# Patient Record
Sex: FEMALE | Race: WHITE | NOT HISPANIC OR LATINO | Employment: OTHER | URBAN - METROPOLITAN AREA
[De-identification: names, ages, dates, MRNs, and addresses within clinical notes are randomized per-mention and may not be internally consistent; named-entity substitution may affect disease eponyms.]

---

## 2023-04-10 DIAGNOSIS — Z86.69 HISTORY OF MIGRAINE HEADACHES: Primary | ICD-10-CM

## 2023-04-10 RX ORDER — BUTALBITAL, ASPIRIN, CAFFEINE AND CODEINE PHOSPHATE 50; 325; 40; 30 MG/1; MG/1; MG/1; MG/1
1 CAPSULE ORAL EVERY 6 HOURS PRN
Qty: 120 CAPSULE | Refills: 1 | Status: SHIPPED | OUTPATIENT
Start: 2023-04-10 | End: 2023-05-10

## 2023-05-03 LAB
ALANINE AMINOTRANSFERASE (SGPT) (U/L) IN SER/PLAS: 9 U/L (ref 7–45)
ALBUMIN (G/DL) IN SER/PLAS: 4 G/DL (ref 3.4–5)
ALKALINE PHOSPHATASE (U/L) IN SER/PLAS: 95 U/L (ref 33–136)
ANION GAP IN SER/PLAS: 8 MMOL/L (ref 10–20)
ASPARTATE AMINOTRANSFERASE (SGOT) (U/L) IN SER/PLAS: 12 U/L (ref 9–39)
BILIRUBIN TOTAL (MG/DL) IN SER/PLAS: 0.3 MG/DL (ref 0–1.2)
CALCIUM (MG/DL) IN SER/PLAS: 9.8 MG/DL (ref 8.6–10.3)
CARBON DIOXIDE, TOTAL (MMOL/L) IN SER/PLAS: 33 MMOL/L (ref 21–32)
CHLORIDE (MMOL/L) IN SER/PLAS: 101 MMOL/L (ref 98–107)
CREATININE (MG/DL) IN SER/PLAS: 0.93 MG/DL (ref 0.5–1.05)
ERYTHROCYTE DISTRIBUTION WIDTH (RATIO) BY AUTOMATED COUNT: 12.4 % (ref 11.5–14.5)
ERYTHROCYTE MEAN CORPUSCULAR HEMOGLOBIN CONCENTRATION (G/DL) BY AUTOMATED: 33.7 G/DL (ref 32–36)
ERYTHROCYTE MEAN CORPUSCULAR VOLUME (FL) BY AUTOMATED COUNT: 97 FL (ref 80–100)
ERYTHROCYTES (10*6/UL) IN BLOOD BY AUTOMATED COUNT: 3.75 X10E12/L (ref 4–5.2)
GFR FEMALE: 66 ML/MIN/1.73M2
GLUCOSE (MG/DL) IN SER/PLAS: 111 MG/DL (ref 74–99)
HEMATOCRIT (%) IN BLOOD BY AUTOMATED COUNT: 36.5 % (ref 36–46)
HEMOGLOBIN (G/DL) IN BLOOD: 12.3 G/DL (ref 12–16)
LEUKOCYTES (10*3/UL) IN BLOOD BY AUTOMATED COUNT: 4.7 X10E9/L (ref 4.4–11.3)
LIPASE (U/L) IN SER/PLAS: 7 U/L (ref 9–82)
PLATELETS (10*3/UL) IN BLOOD AUTOMATED COUNT: 109 X10E9/L (ref 150–450)
POTASSIUM (MMOL/L) IN SER/PLAS: 3.2 MMOL/L (ref 3.5–5.3)
PROTEIN TOTAL: 6.4 G/DL (ref 6.4–8.2)
SEDIMENTATION RATE, ERYTHROCYTE: <1 MM/H (ref 0–30)
SODIUM (MMOL/L) IN SER/PLAS: 139 MMOL/L (ref 136–145)
UREA NITROGEN (MG/DL) IN SER/PLAS: 12 MG/DL (ref 6–23)

## 2023-06-13 PROBLEM — R41.3 MEMORY LOSS: Status: ACTIVE | Noted: 2023-06-13

## 2023-06-13 PROBLEM — I10 PRIMARY HYPERTENSION: Status: ACTIVE | Noted: 2023-06-13

## 2023-06-13 PROBLEM — E03.9 ACQUIRED HYPOTHYROIDISM: Status: ACTIVE | Noted: 2023-06-13

## 2023-06-13 PROBLEM — G43.009 MIGRAINE WITHOUT AURA: Status: ACTIVE | Noted: 2023-06-13

## 2023-06-13 PROBLEM — E78.2 MIXED HYPERLIPIDEMIA: Status: ACTIVE | Noted: 2023-06-13

## 2023-06-13 PROBLEM — K21.9 GASTROESOPHAGEAL REFLUX DISEASE WITHOUT ESOPHAGITIS: Status: ACTIVE | Noted: 2023-06-13

## 2023-06-15 ENCOUNTER — OFFICE VISIT (OUTPATIENT)
Dept: PRIMARY CARE | Facility: CLINIC | Age: 73
End: 2023-06-15
Payer: MEDICARE

## 2023-06-15 VITALS
HEIGHT: 66 IN | DIASTOLIC BLOOD PRESSURE: 82 MMHG | OXYGEN SATURATION: 99 % | SYSTOLIC BLOOD PRESSURE: 144 MMHG | BODY MASS INDEX: 17.84 KG/M2 | TEMPERATURE: 97.3 F | WEIGHT: 111 LBS | HEART RATE: 64 BPM

## 2023-06-15 DIAGNOSIS — R41.3 MEMORY LOSS: Primary | ICD-10-CM

## 2023-06-15 DIAGNOSIS — G43.009 MIGRAINE WITHOUT AURA AND WITHOUT STATUS MIGRAINOSUS, NOT INTRACTABLE: ICD-10-CM

## 2023-06-15 DIAGNOSIS — M53.3 SACRAL PAIN: ICD-10-CM

## 2023-06-15 DIAGNOSIS — E78.2 MIXED HYPERLIPIDEMIA: ICD-10-CM

## 2023-06-15 DIAGNOSIS — E03.9 ACQUIRED HYPOTHYROIDISM: ICD-10-CM

## 2023-06-15 DIAGNOSIS — I10 PRIMARY HYPERTENSION: ICD-10-CM

## 2023-06-15 DIAGNOSIS — K21.9 GASTROESOPHAGEAL REFLUX DISEASE WITHOUT ESOPHAGITIS: ICD-10-CM

## 2023-06-15 PROCEDURE — 3077F SYST BP >= 140 MM HG: CPT | Performed by: FAMILY MEDICINE

## 2023-06-15 PROCEDURE — 99214 OFFICE O/P EST MOD 30 MIN: CPT | Performed by: FAMILY MEDICINE

## 2023-06-15 PROCEDURE — 3079F DIAST BP 80-89 MM HG: CPT | Performed by: FAMILY MEDICINE

## 2023-06-15 PROCEDURE — 1160F RVW MEDS BY RX/DR IN RCRD: CPT | Performed by: FAMILY MEDICINE

## 2023-06-15 PROCEDURE — 1159F MED LIST DOCD IN RCRD: CPT | Performed by: FAMILY MEDICINE

## 2023-06-15 PROCEDURE — 1036F TOBACCO NON-USER: CPT | Performed by: FAMILY MEDICINE

## 2023-06-15 RX ORDER — LEVOTHYROXINE SODIUM 125 UG/1
125 TABLET ORAL DAILY
COMMUNITY
End: 2023-08-07 | Stop reason: SDUPTHER

## 2023-06-15 RX ORDER — ESTROGENS, CONJUGATED 1.25 MG/1
1.25 TABLET, FILM COATED ORAL DAILY
COMMUNITY
End: 2023-07-20 | Stop reason: SDUPTHER

## 2023-06-15 RX ORDER — BUTALBITAL, ASPIRIN, CAFFEINE AND CODEINE PHOSPHATE 50; 325; 40; 30 MG/1; MG/1; MG/1; MG/1
1 CAPSULE ORAL EVERY 6 HOURS PRN
Qty: 20 CAPSULE | Refills: 3 | Status: SHIPPED | OUTPATIENT
Start: 2023-06-15 | End: 2023-06-16 | Stop reason: SDUPTHER

## 2023-06-15 RX ORDER — DONEPEZIL HYDROCHLORIDE 10 MG/1
10 TABLET, FILM COATED ORAL DAILY
COMMUNITY
Start: 2023-04-24 | End: 2023-09-27 | Stop reason: SDUPTHER

## 2023-06-15 RX ORDER — BUTALBITAL, ASPIRIN, CAFFEINE AND CODEINE PHOSPHATE 50; 325; 40; 30 MG/1; MG/1; MG/1; MG/1
1 CAPSULE ORAL EVERY 6 HOURS PRN
COMMUNITY
End: 2023-06-15 | Stop reason: SDUPTHER

## 2023-06-15 RX ORDER — BUTALBITAL, ASPIRIN, AND CAFFEINE 325; 50; 40 MG/1; MG/1; MG/1
1 CAPSULE ORAL EVERY 4 HOURS PRN
COMMUNITY
Start: 2018-02-21 | End: 2023-09-26 | Stop reason: SDUPTHER

## 2023-06-15 RX ORDER — ERGOCALCIFEROL 1.25 MG/1
CAPSULE ORAL
COMMUNITY
End: 2024-02-23 | Stop reason: SDUPTHER

## 2023-06-15 RX ORDER — LISINOPRIL 20 MG/1
30 TABLET ORAL DAILY
COMMUNITY
End: 2024-02-16 | Stop reason: SDUPTHER

## 2023-06-15 RX ORDER — COLCHICINE 0.6 MG/1
0.6 TABLET ORAL DAILY
COMMUNITY
End: 2023-11-06 | Stop reason: SDUPTHER

## 2023-06-15 RX ORDER — AMLODIPINE BESYLATE 10 MG/1
10 TABLET ORAL DAILY
COMMUNITY
End: 2023-06-26 | Stop reason: SDUPTHER

## 2023-06-15 ASSESSMENT — ENCOUNTER SYMPTOMS
CONFUSION: 1
HEADACHES: 1
BACK PAIN: 1

## 2023-06-15 NOTE — PROGRESS NOTES
Subjective   Patient ID: Aravind Smith is a 73 y.o. female who presents for 4 month follow up.    She is here to follow-up on what appears to be progressive (slowly) dementia.  We discussed getting an update with neurology to see if there is further testing to be done.    She also continues to take butalbital with codeine on a daily basis.  It is my feeling that she gets rebound headaches but when she has been evaluated in the past, she did not seem to get any good advice or outcome.  With the neurology referral, we can get a better idea of how to control her headaches and even prevent them.    She has persistent sacral pain which waxes and wanes and I suggest that we refer her to pain management for an evaluation.  She has had negative imaging studies.     OARRS Report Last Screening Date: Brooke 15, 2023  I have personally reviewed the OARRS report for ARAVIND SMITH. I have considered the risks of abuse, dependence, addiction and diversion. I believe that it is clinically appropriate for this patient to be prescribed this medication based on documented diagnosis.   OARRS report is initialed/dated and scanned into the electronic medical record.   Last urine drug screening date/ordered today: October 31, 2022   Controlled Substance Agreement:   I have printed this form and reviewed each line item with the patient and the patient has verbalized understanding.   Date of the last Controlled Substance Agreement: October 31, 2022   OPIOID   Opioid Risk Screening:   Opioid Risk Tool   Last opioid risk screening date/ordered today: 1/16/19  Patient's total score is 0, within range of Low Risk (0-3).   Pain Scale Screening:   Pain Assessment and Documentation Tool (PADT)   Date of Assessment: February 1, 2023  Analgesia:   Patient reports her pain level on average during the past week is 3 on a 0 - 10 scale.   Patient reports that her pain level at its worst during the past week was 8 on a 0 -10 scale.   60 % of pain has  "been relieved during the past week per patient   Patient states that the amount of pain relief she is now obtaining from her current pain reliever(s) is enough to make a real difference in her life.   Query to clinician: Is the patient's pain relief clinically significant? Yes  Activities of Daily Living:   Physical functioning: Better   Family relationships: Better   Social relationships: Better   Mood: Better   Sleep patterns: Better   Overall functioning: Better   Adverse Events:   No, ARAVIND SMITH is experiencing following side effects from current pain reliever.  Patients overall severity of side effect: None  Is your overall impression that this patient is benefiting (e.g., benefits, such as pain relief, outweigh side effects) from opioid therapy? Yes  Comments: Severe chronic migraines.   Specific Analgesic Plan: Continue present regimen.   Referrals or Alternatives: None.     Review of Systems   Musculoskeletal:  Positive for back pain.   Neurological:  Positive for headaches.   Psychiatric/Behavioral:  Positive for confusion.        Objective   /82 (BP Location: Left arm, Patient Position: Sitting, BP Cuff Size: Adult)   Pulse 64   Temp 36.3 °C (97.3 °F) (Skin)   Ht 1.664 m (5' 5.5\")   Wt 50.3 kg (111 lb)   SpO2 99%   BMI 18.19 kg/m²     Physical Exam  Cardiovascular:      Rate and Rhythm: Normal rate and regular rhythm.   Pulmonary:      Effort: Pulmonary effort is normal.      Breath sounds: Normal breath sounds.   Abdominal:      General: There is no distension.      Palpations: There is no mass.   Neurological:      General: No focal deficit present.      Mental Status: She is alert and oriented to person, place, and time.   Psychiatric:         Mood and Affect: Mood normal.         Behavior: Behavior normal.         Assessment/Plan   Problem List Items Addressed This Visit       Memory loss - Primary    Relevant Orders    Referral to Neurology    Acquired hypothyroidism    Migraine " without aura    Relevant Medications    codeine-butalbital-ASA-caff (Fiorinal w/ Codeine) 71--40 mg capsule    Other Relevant Orders    Referral to Pain Medicine    Referral to Neurology    Primary hypertension    Mixed hyperlipidemia    Gastroesophageal reflux disease without esophagitis     Other Visit Diagnoses       Sacral pain        Relevant Orders    Referral to Pain Medicine        I believe we need a different approach to her chronic headaches and I am referring her to both neurology and pain management.  I am also referring to neurology for evaluation of her mental status and any further advice or further testing.  I will follow-up with her in 3 months

## 2023-06-15 NOTE — PATIENT INSTRUCTIONS
I am referring you to neurology and pain medicine for your headaches, intermittent confusion, and sacral pain.  I will follow-up with you in 3 months

## 2023-06-16 ENCOUNTER — TELEPHONE (OUTPATIENT)
Dept: PRIMARY CARE | Facility: CLINIC | Age: 73
End: 2023-06-16
Payer: MEDICARE

## 2023-06-16 RX ORDER — BUTALBITAL, ASPIRIN, CAFFEINE AND CODEINE PHOSPHATE 50; 325; 40; 30 MG/1; MG/1; MG/1; MG/1
1 CAPSULE ORAL EVERY 6 HOURS PRN
Qty: 120 CAPSULE | Refills: 3 | Status: SHIPPED | OUTPATIENT
Start: 2023-06-16 | End: 2023-07-16

## 2023-06-16 NOTE — TELEPHONE ENCOUNTER
Patient needs #120 of butalbital-asa-caff-codeine  send to [facility].  called stating they only got 20.

## 2023-06-19 ENCOUNTER — TELEPHONE (OUTPATIENT)
Dept: PRIMARY CARE | Facility: CLINIC | Age: 73
End: 2023-06-19
Payer: MEDICARE

## 2023-06-19 DIAGNOSIS — I10 PRIMARY HYPERTENSION: ICD-10-CM

## 2023-06-26 ENCOUNTER — TELEPHONE (OUTPATIENT)
Dept: PRIMARY CARE | Facility: CLINIC | Age: 73
End: 2023-06-26
Payer: MEDICARE

## 2023-06-26 RX ORDER — AMLODIPINE BESYLATE 10 MG/1
10 TABLET ORAL DAILY
Qty: 90 TABLET | Refills: 1 | Status: SHIPPED | OUTPATIENT
Start: 2023-06-26 | End: 2023-12-27 | Stop reason: WASHOUT

## 2023-06-26 NOTE — TELEPHONE ENCOUNTER
Vick is asking for some clarification on the directions of the pt's Amlodipine you sent today.   Ph: [phone]

## 2023-07-20 DIAGNOSIS — Z78.0 POST-MENOPAUSAL: ICD-10-CM

## 2023-08-07 DIAGNOSIS — E03.9 ACQUIRED HYPOTHYROIDISM: ICD-10-CM

## 2023-08-07 RX ORDER — LEVOTHYROXINE SODIUM 125 UG/1
125 TABLET ORAL DAILY
Qty: 90 TABLET | Refills: 1 | Status: SHIPPED | OUTPATIENT
Start: 2023-08-07 | End: 2023-08-15 | Stop reason: SDUPTHER

## 2023-08-15 DIAGNOSIS — E03.9 ACQUIRED HYPOTHYROIDISM: ICD-10-CM

## 2023-08-15 RX ORDER — LEVOTHYROXINE SODIUM 125 UG/1
125 TABLET ORAL DAILY
Qty: 90 TABLET | Refills: 1 | Status: SHIPPED | OUTPATIENT
Start: 2023-08-15 | End: 2024-05-06 | Stop reason: SDUPTHER

## 2023-09-19 ENCOUNTER — APPOINTMENT (OUTPATIENT)
Dept: PRIMARY CARE | Facility: CLINIC | Age: 73
End: 2023-09-19
Payer: MEDICARE

## 2023-09-20 PROBLEM — Z86.39 HISTORY OF HYPERPARATHYROIDISM: Status: ACTIVE | Noted: 2023-09-20

## 2023-09-20 PROBLEM — K21.9 GERD (GASTROESOPHAGEAL REFLUX DISEASE): Status: ACTIVE | Noted: 2023-09-20

## 2023-09-20 PROBLEM — R10.2 PELVIC PAIN IN FEMALE: Status: ACTIVE | Noted: 2023-09-20

## 2023-09-20 PROBLEM — I45.10 RIGHT BUNDLE BRANCH BLOCK (RBBB): Status: ACTIVE | Noted: 2023-09-20

## 2023-09-20 PROBLEM — E03.9 HYPOTHYROIDISM (ACQUIRED): Status: ACTIVE | Noted: 2023-09-20

## 2023-09-20 PROBLEM — E04.9 GOITER, NODULAR: Status: ACTIVE | Noted: 2023-09-20

## 2023-09-20 PROBLEM — F43.21 SITUATIONAL DEPRESSION: Status: ACTIVE | Noted: 2023-09-20

## 2023-09-23 PROBLEM — F32.A ANXIETY AND DEPRESSION: Status: ACTIVE | Noted: 2023-09-23

## 2023-09-23 PROBLEM — I10 HYPERTENSION: Status: ACTIVE | Noted: 2023-09-23

## 2023-09-23 PROBLEM — E78.5 BORDERLINE HYPERLIPIDEMIA: Status: ACTIVE | Noted: 2023-09-23

## 2023-09-23 PROBLEM — E55.9 VITAMIN D DEFICIENCY: Status: ACTIVE | Noted: 2023-09-23

## 2023-09-23 PROBLEM — Z79.891 LONG TERM (CURRENT) USE OF OPIATE ANALGESIC: Status: ACTIVE | Noted: 2023-09-23

## 2023-09-23 PROBLEM — M19.90 ARTHRITIS: Status: ACTIVE | Noted: 2023-09-23

## 2023-09-23 PROBLEM — F41.9 ANXIETY AND DEPRESSION: Status: ACTIVE | Noted: 2023-09-23

## 2023-09-23 PROBLEM — K83.8 DILATED BILE DUCT: Status: ACTIVE | Noted: 2023-09-23

## 2023-09-23 PROBLEM — R10.9 ABDOMINAL PAIN: Status: ACTIVE | Noted: 2023-09-23

## 2023-09-23 PROBLEM — R13.14 DYSPHAGIA, PHARYNGOESOPHAGEAL PHASE: Status: ACTIVE | Noted: 2023-09-23

## 2023-09-23 PROBLEM — D69.6 THROMBOCYTOPENIA, UNSPECIFIED (CMS-HCC): Status: ACTIVE | Noted: 2023-09-23

## 2023-09-23 PROBLEM — H61.23 HEARING LOSS OF BOTH EARS DUE TO CERUMEN IMPACTION: Status: ACTIVE | Noted: 2023-09-23

## 2023-09-23 PROBLEM — H93.13 BILATERAL TINNITUS: Status: ACTIVE | Noted: 2023-09-23

## 2023-09-23 PROBLEM — R74.8 ELEVATED ALKALINE PHOSPHATASE LEVEL: Status: ACTIVE | Noted: 2023-09-23

## 2023-09-23 PROBLEM — R63.4 WEIGHT LOSS, ABNORMAL: Status: ACTIVE | Noted: 2023-09-23

## 2023-09-23 RX ORDER — MEDROXYPROGESTERONE ACETATE 2.5 MG/1
1 TABLET ORAL DAILY
COMMUNITY
Start: 2008-05-12 | End: 2023-09-26 | Stop reason: ALTCHOICE

## 2023-09-23 RX ORDER — HYOSCYAMINE SULFATE 0.38 MG/1
1 TABLET, EXTENDED RELEASE ORAL 2 TIMES DAILY
COMMUNITY
Start: 2023-01-16 | End: 2023-09-26

## 2023-09-23 RX ORDER — MIRTAZAPINE 30 MG/1
1 TABLET, FILM COATED ORAL NIGHTLY
COMMUNITY
Start: 2022-06-16 | End: 2023-09-26

## 2023-09-23 RX ORDER — LOPERAMIDE HCL 1MG/7.5ML
LIQUID (ML) ORAL
COMMUNITY
End: 2023-09-26

## 2023-09-23 RX ORDER — FAMOTIDINE 40 MG/1
1 TABLET, FILM COATED ORAL DAILY
COMMUNITY
Start: 2022-07-11 | End: 2023-09-26

## 2023-09-23 RX ORDER — OMEPRAZOLE 20 MG/1
20 CAPSULE, DELAYED RELEASE ORAL
COMMUNITY
Start: 2016-10-26 | End: 2023-09-26 | Stop reason: ALTCHOICE

## 2023-09-23 RX ORDER — CLINDAMYCIN PHOSPHATE 10 UG/ML
LOTION TOPICAL
COMMUNITY
Start: 2016-08-18 | End: 2023-09-26

## 2023-09-23 RX ORDER — ZOLMITRIPTAN 5 MG/1
5 TABLET, FILM COATED ORAL ONCE AS NEEDED
COMMUNITY
Start: 2015-06-25 | End: 2023-09-26 | Stop reason: ALTCHOICE

## 2023-09-23 RX ORDER — DILTIAZEM HYDROCHLORIDE 120 MG/1
120 CAPSULE, EXTENDED RELEASE ORAL DAILY
COMMUNITY
Start: 2015-06-25 | End: 2023-09-26 | Stop reason: ALTCHOICE

## 2023-09-23 RX ORDER — DICYCLOMINE HYDROCHLORIDE 20 MG/1
1 TABLET ORAL
COMMUNITY
Start: 2022-11-08 | End: 2023-09-26

## 2023-09-23 RX ORDER — DONEPEZIL HYDROCHLORIDE 5 MG/1
1 TABLET, FILM COATED ORAL DAILY
COMMUNITY
Start: 2022-07-19 | End: 2023-09-26

## 2023-09-23 RX ORDER — OMEPRAZOLE 20 MG/1
20 TABLET, DELAYED RELEASE ORAL
COMMUNITY
Start: 2015-06-25 | End: 2023-09-26 | Stop reason: ALTCHOICE

## 2023-09-23 RX ORDER — CINACALCET 60 MG/1
30 TABLET, FILM COATED ORAL
COMMUNITY
Start: 2020-03-09 | End: 2023-09-26

## 2023-09-23 RX ORDER — BUTALBITAL, ASPIRIN, CAFFEINE AND CODEINE PHOSPHATE 50; 325; 40; 30 MG/1; MG/1; MG/1; MG/1
1 CAPSULE ORAL EVERY 6 HOURS PRN
COMMUNITY
Start: 2023-07-19 | End: 2023-12-06 | Stop reason: ALTCHOICE

## 2023-09-23 RX ORDER — LOPERAMIDE HYDROCHLORIDE 2 MG/1
CAPSULE ORAL
COMMUNITY
End: 2023-12-06 | Stop reason: ALTCHOICE

## 2023-09-26 ENCOUNTER — OFFICE VISIT (OUTPATIENT)
Dept: PRIMARY CARE | Facility: CLINIC | Age: 73
End: 2023-09-26
Payer: MEDICARE

## 2023-09-26 VITALS
WEIGHT: 113.4 LBS | SYSTOLIC BLOOD PRESSURE: 110 MMHG | OXYGEN SATURATION: 97 % | BODY MASS INDEX: 19.47 KG/M2 | DIASTOLIC BLOOD PRESSURE: 66 MMHG | HEART RATE: 65 BPM | TEMPERATURE: 97.4 F

## 2023-09-26 DIAGNOSIS — R10.2 PELVIC PAIN IN FEMALE: ICD-10-CM

## 2023-09-26 DIAGNOSIS — E03.9 HYPOTHYROIDISM (ACQUIRED): ICD-10-CM

## 2023-09-26 DIAGNOSIS — I10 PRIMARY HYPERTENSION: Primary | ICD-10-CM

## 2023-09-26 DIAGNOSIS — G43.009 MIGRAINE WITHOUT AURA AND WITHOUT STATUS MIGRAINOSUS, NOT INTRACTABLE: ICD-10-CM

## 2023-09-26 DIAGNOSIS — K21.9 GASTROESOPHAGEAL REFLUX DISEASE WITHOUT ESOPHAGITIS: ICD-10-CM

## 2023-09-26 DIAGNOSIS — Z23 IMMUNIZATION DUE: ICD-10-CM

## 2023-09-26 DIAGNOSIS — R41.3 MEMORY LOSS: ICD-10-CM

## 2023-09-26 PROCEDURE — 1036F TOBACCO NON-USER: CPT | Performed by: FAMILY MEDICINE

## 2023-09-26 PROCEDURE — 3074F SYST BP LT 130 MM HG: CPT | Performed by: FAMILY MEDICINE

## 2023-09-26 PROCEDURE — G0008 ADMIN INFLUENZA VIRUS VAC: HCPCS | Performed by: FAMILY MEDICINE

## 2023-09-26 PROCEDURE — 90662 IIV NO PRSV INCREASED AG IM: CPT | Performed by: FAMILY MEDICINE

## 2023-09-26 PROCEDURE — 1159F MED LIST DOCD IN RCRD: CPT | Performed by: FAMILY MEDICINE

## 2023-09-26 PROCEDURE — 3078F DIAST BP <80 MM HG: CPT | Performed by: FAMILY MEDICINE

## 2023-09-26 PROCEDURE — 99213 OFFICE O/P EST LOW 20 MIN: CPT | Performed by: FAMILY MEDICINE

## 2023-09-26 PROCEDURE — 1160F RVW MEDS BY RX/DR IN RCRD: CPT | Performed by: FAMILY MEDICINE

## 2023-09-26 RX ORDER — BUTALBITAL, ASPIRIN, AND CAFFEINE 325; 50; 40 MG/1; MG/1; MG/1
1 CAPSULE ORAL EVERY 4 HOURS PRN
Qty: 180 CAPSULE | Refills: 1 | Status: SHIPPED | OUTPATIENT
Start: 2023-09-26 | End: 2023-10-23

## 2023-09-26 RX ORDER — MULTIVITAMIN
1 TABLET ORAL DAILY
COMMUNITY

## 2023-09-26 RX ORDER — OMEPRAZOLE 20 MG/1
20 TABLET, DELAYED RELEASE ORAL
Qty: 90 TABLET | Refills: 3 | Status: SHIPPED | OUTPATIENT
Start: 2023-09-26 | End: 2023-10-27 | Stop reason: ALTCHOICE

## 2023-09-26 ASSESSMENT — PATIENT HEALTH QUESTIONNAIRE - PHQ9
1. LITTLE INTEREST OR PLEASURE IN DOING THINGS: NOT AT ALL
2. FEELING DOWN, DEPRESSED OR HOPELESS: NOT AT ALL
SUM OF ALL RESPONSES TO PHQ9 QUESTIONS 1 AND 2: 0

## 2023-09-26 ASSESSMENT — ENCOUNTER SYMPTOMS
LOSS OF SENSATION IN FEET: 0
OCCASIONAL FEELINGS OF UNSTEADINESS: 0
DEPRESSION: 0

## 2023-09-26 NOTE — PROGRESS NOTES
Subjective   Patient ID: Lis Zeng is a 73 y.o. female who presents for 3 month follow up.    She is here to follow-up on memory loss, migraine headaches, hypertension, hypothyroidism and GERD.  She has an upcoming appointment to see neurology (we hope) to evaluate her dementia as well as her frequent headaches.  She is hesitant to go to pain management because she has had painful spot injections in her knee and lower back before.             Objective   /66 (BP Location: Left arm, Patient Position: Sitting, BP Cuff Size: Adult)   Pulse 65   Temp 36.3 °C (97.4 °F) (Skin)   Wt 51.4 kg (113 lb 6.4 oz)   SpO2 97%   BMI 19.47 kg/m²     Physical Exam  Constitutional:       Appearance: Normal appearance. She is normal weight.   Cardiovascular:      Rate and Rhythm: Normal rate and regular rhythm.   Pulmonary:      Effort: Pulmonary effort is normal.      Breath sounds: Normal breath sounds.   Abdominal:      General: There is no distension.      Palpations: There is no mass.   Neurological:      General: No focal deficit present.      Mental Status: She is alert and oriented to person, place, and time.   Psychiatric:         Mood and Affect: Mood normal.         Behavior: Behavior normal.         Assessment/Plan   Problem List Items Addressed This Visit             ICD-10-CM    Memory loss R41.3    Migraine without aura G43.009    Relevant Medications    butalbital-aspirin-caffeine (Fiorinal) -40 mg capsule    Primary hypertension - Primary I10    Pelvic pain in female R10.2    Hypothyroidism (acquired) E03.9    GERD (gastroesophageal reflux disease) K21.9    Relevant Medications    omeprazole OTC (PriLOSEC OTC) 20 mg EC tablet     Other Visit Diagnoses         Codes    Immunization due     Z23    Relevant Orders    Flu vaccine, quadrivalent, high-dose, preservative free, age 65y+ (FLUZONE)        She will follow-up in 4 months with Dr. Patel and they may get an appointment to have her see a  gerontologist.  We gave her the enhanced influenza vaccine today.

## 2023-09-27 DIAGNOSIS — R41.3 MEMORY LOSS: ICD-10-CM

## 2023-09-27 RX ORDER — DONEPEZIL HYDROCHLORIDE 10 MG/1
10 TABLET, FILM COATED ORAL DAILY
Qty: 90 TABLET | Refills: 1 | Status: SHIPPED | OUTPATIENT
Start: 2023-09-27 | End: 2024-04-25 | Stop reason: SDUPTHER

## 2023-09-29 NOTE — TELEPHONE ENCOUNTER
Pt's spouse called and said that he is looking for a referral for a geriatric nutrition specialist, Specifically Dr. Nery Santos of [facility]. If the referral can be done the fax #[phone]

## 2023-10-12 ENCOUNTER — APPOINTMENT (OUTPATIENT)
Dept: PRIMARY CARE | Facility: CLINIC | Age: 73
End: 2023-10-12
Payer: MEDICARE

## 2023-10-23 ENCOUNTER — OFFICE VISIT (OUTPATIENT)
Dept: NEUROLOGY | Facility: HOSPITAL | Age: 73
End: 2023-10-23
Payer: MEDICARE

## 2023-10-23 VITALS
WEIGHT: 109.4 LBS | DIASTOLIC BLOOD PRESSURE: 73 MMHG | SYSTOLIC BLOOD PRESSURE: 159 MMHG | HEART RATE: 74 BPM | BODY MASS INDEX: 18.78 KG/M2

## 2023-10-23 DIAGNOSIS — F03.918 DEMENTIA WITH OTHER BEHAVIORAL DISTURBANCE, UNSPECIFIED DEMENTIA SEVERITY, UNSPECIFIED DEMENTIA TYPE (MULTI): ICD-10-CM

## 2023-10-23 DIAGNOSIS — Z91.148: ICD-10-CM

## 2023-10-23 DIAGNOSIS — R51.9 CHRONIC DAILY HEADACHE: Primary | ICD-10-CM

## 2023-10-23 PROBLEM — F03.90 DEMENTIA (MULTI): Status: ACTIVE | Noted: 2023-10-23

## 2023-10-23 PROBLEM — F03.93 DEMENTIA WITH MOOD DISTURBANCE (MULTI): Status: ACTIVE | Noted: 2023-06-13

## 2023-10-23 PROBLEM — G43.009 MIGRAINE WITHOUT AURA: Status: RESOLVED | Noted: 2023-06-13 | Resolved: 2023-10-23

## 2023-10-23 PROBLEM — F03.93 DEMENTIA WITH MOOD DISTURBANCE (MULTI): Status: ACTIVE | Noted: 2023-10-23

## 2023-10-23 PROCEDURE — 99417 PROLNG OP E/M EACH 15 MIN: CPT | Performed by: PSYCHIATRY & NEUROLOGY

## 2023-10-23 PROCEDURE — 1159F MED LIST DOCD IN RCRD: CPT | Performed by: PSYCHIATRY & NEUROLOGY

## 2023-10-23 PROCEDURE — 3077F SYST BP >= 140 MM HG: CPT | Performed by: PSYCHIATRY & NEUROLOGY

## 2023-10-23 PROCEDURE — 1160F RVW MEDS BY RX/DR IN RCRD: CPT | Performed by: PSYCHIATRY & NEUROLOGY

## 2023-10-23 PROCEDURE — 1157F ADVNC CARE PLAN IN RCRD: CPT | Performed by: PSYCHIATRY & NEUROLOGY

## 2023-10-23 PROCEDURE — 3078F DIAST BP <80 MM HG: CPT | Performed by: PSYCHIATRY & NEUROLOGY

## 2023-10-23 PROCEDURE — 1036F TOBACCO NON-USER: CPT | Performed by: PSYCHIATRY & NEUROLOGY

## 2023-10-23 PROCEDURE — 1125F AMNT PAIN NOTED PAIN PRSNT: CPT | Performed by: PSYCHIATRY & NEUROLOGY

## 2023-10-23 PROCEDURE — G2212 PROLONG OUTPT/OFFICE VIS: HCPCS | Performed by: PSYCHIATRY & NEUROLOGY

## 2023-10-23 PROCEDURE — 99215 OFFICE O/P EST HI 40 MIN: CPT | Performed by: PSYCHIATRY & NEUROLOGY

## 2023-10-23 RX ORDER — PROPRANOLOL HYDROCHLORIDE 10 MG/1
10 TABLET ORAL 2 TIMES DAILY
Qty: 60 TABLET | Refills: 1 | Status: SHIPPED | OUTPATIENT
Start: 2023-10-23 | End: 2023-11-07 | Stop reason: SDUPTHER

## 2023-10-23 RX ORDER — BUTALBITAL, ASPIRIN, AND CAFFEINE 325; 50; 40 MG/1; MG/1; MG/1
1 CAPSULE ORAL EVERY 6 HOURS PRN
Qty: 45 CAPSULE | Refills: 0 | Status: SHIPPED | OUTPATIENT
Start: 2023-10-23 | End: 2023-11-22

## 2023-10-23 ASSESSMENT — ENCOUNTER SYMPTOMS
LOSS OF SENSATION IN FEET: 0
OCCASIONAL FEELINGS OF UNSTEADINESS: 1
DEPRESSION: 0

## 2023-10-23 ASSESSMENT — PAIN SCALES - GENERAL: PAINLEVEL: 3

## 2023-10-23 NOTE — PATIENT INSTRUCTIONS
Consider using propranolol  Consider weaning down Fiorinal + codeine  Discussed overuse HA cannot be treated with continuing daily prn medication  4.   Consider geriatric psychiatry referral    Rtc 1 mo

## 2023-10-23 NOTE — PROGRESS NOTES
"In-clinic visit    Visit type: provider referral: Dr Hope     PCP: Paul Em MD.    Subjective     Lis Zeng is a 73 y.o. year old right handed female who presents with Memory Loss and Migraine (Pt unsure why she's here.  This info given by .)    Patient is accompanied by: spouse.     HPI    Most of history is from spouse. Pt says something, but spouse appears to be validating perhaps only 50-75% of what she's saying is true. Below is best story I am able to get.    Referred for HA. Here states has HA and memory issues.    For 15-20 years now, has had \"standard headache\".  HA always in the crown of head, then goes down the neck. Non-throbbing. Aching. (+) light sensitivity in past, not much now. (-) sound sensitivity. Hx migraines when younger. (+) nausea in past, not much now. (-) vomiting. States she has a HA daily. Spouse describes it as a persistent HA that fluctuates in intensity. He knows she's in pain because she tells him. On Fiorinal-codeine x ~10 years. Currently that is the medicine of choice. States no head imaging, but I saw an MRI brain done 11/2021, ordered by PCP, for memory loss and HA, that did not show any chiari, no tumor, no hydrocephalus, with some vol loss. When asked, spouse states she had seen at least 2 other neurologists in the past (? when), was told to cut down on pain pill \"cold turkey\" and she \"didn't want to do that\" and \"had no will power to do that\".    In past, tried Zomig tab, sometimes triptan injection. Aspirin. No other medication tried.    Pt had been under the care of PCP for years.    No asthma. No known heart issues. No glaucoma. No kidney stones.    Pt states she finished grad school, but spouse clarified she didn't. She did finish college.    Regarding memory issues, review of PCP notes reveal she is believed to have progressive dementia. Med list includes donepezil. Per spouse, family has strong history of it. Dementia in father and sister. " "Uncles have had it. \"Hasn't been a problem\" per spouse until about last 1-2 years, had been more noticeable. \"General cognition may be a problem\". She couldn't remember this appointment, despite spouse reminder of \"25 times\" about this appointment. She has not been driving since at least 2015 when spouse retired--spouse had been happy to drive. However, these days she would not even remember how to get home so spouse would not have trusted her to drive these days. Lives with spouse. Spouse now cooking. Bill payment spouse has been doing. Doesn't remember something just spoken about.    Pt states she can't remember what she did for work before. Per spouse, she finished college. 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 Traveled a lot. Then moved to [place], worked for [employer].    It is in this context of cognitive issues, that spouse states it has been contentious trying to get her off medication, as she complains of headache, spouse aware of prior recommendations to wean off pain pill, but unable to because \"she asks for it\". It appears she is being prescribed about 120 pills of ASA-butalb-caff-cod #3 that she fills each month. Spouse says they only have 4 pills left right now.    No smoking. No alcohol. No street drug use/marijuana use.    Review of Systems   Unable    Patient Active Problem List   Diagnosis    Acquired hypothyroidism    Primary hypertension    Mixed hyperlipidemia    Gastroesophageal reflux disease without esophagitis    Situational depression    Goiter, nodular    History of hyperparathyroidism    Pelvic pain in female    Right bundle branch block (RBBB)    Hypothyroidism (acquired)    GERD (gastroesophageal reflux disease)    Abdominal pain    Anxiety and depression    Arthritis    Bilateral tinnitus    Borderline hyperlipidemia    Dilated bile duct    Elevated alkaline phosphatase level    Hearing loss of both ears due to cerumen impaction    Hypertension    Long term (current) use of opiate " analgesic    Dysphagia, pharyngoesophageal phase    Thrombocytopenia, unspecified (CMS/HCC)    Vitamin D deficiency    Weight loss, abnormal    Dementia (CMS/HCC)    Chronic daily headache       Past Medical History:   Diagnosis Date    Encounter for general adult medical examination without abnormal findings 01/20/2021    Medicare annual wellness visit, initial    Migraine without aura 06/13/2023    Other conditions influencing health status     Crystalline Arthropathies    Pelvic and perineal pain 07/29/2021    Pelvic pain in female    Personal history of other diseases of the female genital tract     History of endometriosis    Personal history of other diseases of the musculoskeletal system and connective tissue     History of pseudogout    Personal history of other diseases of the musculoskeletal system and connective tissue     History of fibromyositis    Personal history of other diseases of the nervous system and sense organs     History of migraine    Personal history of other endocrine, nutritional and metabolic disease     History of hyperparathyroidism     Past Surgical History:   Procedure Laterality Date    BREAST BIOPSY  10/31/2017    Biopsy Breast Open    CHOLECYSTECTOMY  10/10/2012    Cholecystectomy    OTHER SURGICAL HISTORY  10/10/2012    Parathyroid Surgery    TOTAL ABDOMINAL HYSTERECTOMY W/ BILATERAL SALPINGOOPHORECTOMY  10/31/2017    Total Abdominal Hysterectomy With Removal Of Both Ovaries     Social History     Tobacco Use    Smoking status: Never     Passive exposure: Past    Smokeless tobacco: Never   Substance Use Topics    Alcohol use: Not Currently     family history includes Alzheimer's disease in her father and another family member; Colon cancer in her mother and another family member; Diabetes in her mother, sister, and another family member; Paget's disease of bone in an other family member; Pemphigus vulgaris in an other family member; Pneumonia in an other family  member.    Allergies   Allergen Reactions    Penicillins Other, Hives and Nausea Only    Sulfa (Sulfonamide Antibiotics) Nausea/vomiting       Current Outpatient Medications:     amLODIPine (Norvasc) 10 mg tablet, Take 1 tablet (10 mg) by mouth once daily. for blood pressure  taking 1/2 pill, Disp: 90 tablet, Rfl: 1    codeine-butalbital-ASA-caff (Fiorinal w/ Codeine) 46--40 mg capsule, Take 1 capsule by mouth every 6 hours if needed for headaches., Disp: , Rfl:     colchicine 0.6 mg tablet, Take 1 tablet (0.6 mg) by mouth once daily. AS DIRECTED, Disp: , Rfl:     donepezil (Aricept) 10 mg tablet, Take 1 tablet (10 mg) by mouth once daily., Disp: 90 tablet, Rfl: 1    ergocalciferol (Vitamin D-2) 1.25 MG (51106 UT) capsule, 1 (one) time per week., Disp: , Rfl:     estrogens, conjugated, (Premarin) 1.25 mg tablet, Take 1 tablet (1.25 mg) by mouth once daily., Disp: 90 tablet, Rfl: 1    levothyroxine (Synthroid, Levoxyl) 125 mcg tablet, Take 1 tablet (125 mcg) by mouth once daily. (Patient taking differently: Take 1 tablet (125 mcg) by mouth. 5 days/week), Disp: 90 tablet, Rfl: 1    lisinopril 20 mg tablet, Take 1 tablet (20 mg) by mouth once daily. as directed, Disp: , Rfl:     loperamide (Imodium A-D) 2 mg capsule, Take by mouth., Disp: , Rfl:     multivitamin tablet, Take 1 tablet by mouth once daily., Disp: , Rfl:     butalbital-aspirin-caffeine (Fiorinal) -40 mg capsule, Take 1 capsule by mouth every 6 hours if needed for headaches. Take no more than 2 pills a day, no more than 2-3x/week, Disp: 45 capsule, Rfl: 0    propranolol (Inderal) 10 mg tablet, Take 1 tablet (10 mg) by mouth 2 times a day., Disp: 60 tablet, Rfl: 1    Objective     /73   Pulse 74   Wt 49.6 kg (109 lb 6.4 oz)   BMI 18.78 kg/m²     Awake, alert, oriented, in no distress  Well-nourished, ambulatory independently  No leg edema    Pt appears to get lost in conversation at times, and expresses frustration that she doesn't  understand what's happening and becomes irritable and starts arguing with spouse    Mental status exam as above, conversant   Fund of knowledge/memory not formally assessed  Pupils round reactive to light, 3-4 mm, (-) RAPD   Fundoscopic examination was attempted but fundus was not visualized bilaterally   Full EOMs intact, no nystagmus, no ptosis   V1 to V3 sensation is intact   No facial droop   Hearing grossly intact   No dysarthria  Good shoulder shrug bilaterally   Tongue is midline     Motor strength is 5/5 on all extremities, tone/bulk normal   Reflexes 1+ on all 4 extremities, downgoing toes bilaterally   Sensation is intact to light touch, vibration on all 4 extremities   Finger to nose test intact bilaterally   Negative Romberg sign   Normal gait       Assessment/Plan   Problem List Items Addressed This Visit             ICD-10-CM    Dementia (CMS/[facility]) F03.90     Dementia, unknown type, poss Alzheimer's vs other, appears to have behavioral issues  ? hallucinations  No time for SLUMS/MoCA  On donepezil 10mg daily per med list  Discussed referral to geriatric psychiatry due to behavior issues--spouse asks about seeing geriatrician--advised to call geriatrician office to inquire         Relevant Orders    Follow Up In Neurology    Chronic daily headache - Primary R51.9     Pain in crown of head going down to neck  Not compatible with migraine or tension HA  No features on exam to suggest cervicogenic  Cannot exclude possibility of analgesic overuse HA or transformed headache  Unsure if truly has pain, as bad and as often as she states, given underlying dementia  Brain imaging findings were also discussed from 2021  Pt taking up to 5-6 tabs of Fioricet-codeine A DAY, was taking about 2-3 A DAY in past  Discussed, lacking is preventative medication. In the past, her HA treatment had revolved around migraine treatment  The patient has been advised to limit NSAID and analgesic use to no more than 3 times a week.  "  Discussed, I suspect patient may very well have analgesic overuse headache currently. Discussed, treatment of this is actually lessening medications--which they had been told of in the past per spouse report, but pt resistant.  It was at this point that pt is becoming more irritable, and states that she doesn't quite understand why she has to wean down/off Fioricet-codeine despite at least 2-3x of explanation.  I clearly explained to pt/spouse, I do not prescribe codeine. The controlled substance in her medications (butalbital and codeine not helpful, at the least, to her cognitive situation, especially on a chronic basis) and may be contributing to possible analgesic overuse.  I made it clear to spouse and pt that while I prescribe Fiorinal/Fioricet, I do not prescribe codeine, and certainly not long term. And the Fiorinal/Fioricet that I prescribe is no more than 10 pills a month--strictly for as-needed use.  I recommended weaning off Fiorinal/Fioricet in 2 months and starting propranolol as well. I told them that if she runs out of her Fiorinal before the allotted time she is not going to get a refill from me.  I am not in favor of perpetuating Fiorinal/Fioricet use chronically and at such amounts that she is currently taking.  Discussed propranolol choice, if only because she has been treated for, and has hx migraine (though HA now not clearly migrainous), and other medications (eg TCA, SSRI/SNRI) will have cognitive SE as well  I did discuss with pt/spouse that her HA is possibly going to be worse, before it gets better, by lessening her analgesics.  It was obvious that while spouse is on-board to some degree with plans, he is realistically skeptical of the success of this strategy as he states she \"asks\" for the pain medication and that \"he has to live with her\". Pt herself appears to be not on board with my plan.  I did offer to refer her to headache specialist, as perhaps they will have a better option, to " "which her spouse was quick to defer, saying \"they're likely going to say the same thing\".         Relevant Medications    propranolol (Inderal) 10 mg tablet    butalbital-aspirin-caffeine (Fiorinal) -40 mg capsule    Other Relevant Orders    Follow Up In Neurology    Continuous overuse of medication Z91.148     Plans:  Consider using propranolol--start at propranolol 10mg bid  Consider weaning down Fiorinal + codeine--off codeine and very limited Fiorinal use to none, in 2 months (given chronicity of being on this medication)  Discussed overuse HA cannot be treated with continuing daily prn medication  Consider seeing geriatric psychiatry    Visit today went so much over time as pt circuitous and irritable when plans were being discussed and I had to end the visit. I recommended to them that they can talk about the recommendations at home and get back with my office with their decision. At the end, spouse that prescription be sent, and he \"will see what they will do\" after they get home and talk about it.    Rx for propranolol 10mg bid erx'd  Rx for Fiorinal 1 tab q6h prn, no more than 2 pills per day, no more than 2-3x/week--45 pills, 0 refills    Rtc 1 mo    All questions were answered.  Pt knows how to contact my office in case pt has any questions or concerns.    Kemar Byrne MD       "

## 2023-10-23 NOTE — ASSESSMENT & PLAN NOTE
Pain in crown of head going down to neck  Not compatible with migraine or tension HA  No features on exam to suggest cervicogenic  Cannot exclude possibility of analgesic overuse HA or transformed headache  Unsure if truly has pain, as bad and as often as she states, given underlying dementia  Brain imaging findings were also discussed from 2021  Pt taking up to 5-6 tabs of Fioricet-codeine A DAY, was taking about 2-3 A DAY in past  Discussed, lacking is preventative medication. In the past, her HA treatment had revolved around migraine treatment  The patient has been advised to limit NSAID and analgesic use to no more than 3 times a week.   Discussed, I suspect patient may very well have analgesic overuse headache currently. Discussed, treatment of this is actually lessening medications--which they had been told of in the past per spouse report, but pt resistant.  It was at this point that pt is becoming more irritable, and states that she doesn't quite understand why she has to wean down/off Fioricet-codeine despite at least 2-3x of explanation.  I clearly explained to pt/spouse, I do not prescribe codeine. The controlled substance in her medications (butalbital and codeine not helpful, at the least, to her cognitive situation, especially on a chronic basis) and may be contributing to possible analgesic overuse.  I made it clear to spouse and pt that while I prescribe Fiorinal/Fioricet, I do not prescribe codeine, and certainly not long term. And the Fiorinal/Fioricet that I prescribe is no more than 10 pills a month--strictly for as-needed use.  I recommended weaning off Fiorinal/Fioricet in 2 months and starting propranolol as well. I told them that if she runs out of her Fiorinal before the allotted time she is not going to get a refill from me.  I am not in favor of perpetuating Fiorinal/Fioricet use chronically and at such amounts that she is currently taking.  Discussed propranolol choice, if only because  "she has been treated for, and has hx migraine (though HA now not clearly migrainous), and other medications (eg TCA, SSRI/SNRI) will have cognitive SE as well  I did discuss with pt/spouse that her HA is possibly going to be worse, before it gets better, by lessening her analgesics.  It was obvious that while spouse is on-board to some degree with plans, he is realistically skeptical of the success of this strategy as he states she \"asks\" for the pain medication and that \"he has to live with her\". Pt herself appears to be not on board with my plan.  I did offer to refer her to headache specialist, as perhaps they will have a better option, to which her spouse was quick to defer, saying \"they're likely going to say the same thing\".  "

## 2023-10-24 NOTE — ASSESSMENT & PLAN NOTE
Dementia, unknown type, poss Alzheimer's vs other, appears to have behavioral issues  ? hallucinations  No time for SLUMS/MoCA  On donepezil 10mg daily per med list  Discussed referral to geriatric psychiatry due to behavior issues--spouse asks about seeing geriatrician--advised to call geriatrician office to inquire

## 2023-10-25 DIAGNOSIS — Z12.31 ENCOUNTER FOR SCREENING MAMMOGRAM FOR BREAST CANCER: Primary | ICD-10-CM

## 2023-10-27 ENCOUNTER — OFFICE VISIT (OUTPATIENT)
Dept: PRIMARY CARE | Facility: CLINIC | Age: 73
End: 2023-10-27
Payer: MEDICARE

## 2023-10-27 VITALS
DIASTOLIC BLOOD PRESSURE: 88 MMHG | BODY MASS INDEX: 18.33 KG/M2 | HEART RATE: 71 BPM | WEIGHT: 106.8 LBS | TEMPERATURE: 97.9 F | SYSTOLIC BLOOD PRESSURE: 140 MMHG | OXYGEN SATURATION: 98 %

## 2023-10-27 DIAGNOSIS — F03.90 DEMENTIA, UNSPECIFIED DEMENTIA SEVERITY, UNSPECIFIED DEMENTIA TYPE, UNSPECIFIED WHETHER BEHAVIORAL, PSYCHOTIC, OR MOOD DISTURBANCE OR ANXIETY (MULTI): ICD-10-CM

## 2023-10-27 DIAGNOSIS — E03.9 HYPOTHYROIDISM (ACQUIRED): ICD-10-CM

## 2023-10-27 DIAGNOSIS — E55.9 VITAMIN D DEFICIENCY: ICD-10-CM

## 2023-10-27 DIAGNOSIS — R10.9 ABDOMINAL PAIN, UNSPECIFIED ABDOMINAL LOCATION: ICD-10-CM

## 2023-10-27 DIAGNOSIS — I10 HYPERTENSION, UNSPECIFIED TYPE: ICD-10-CM

## 2023-10-27 DIAGNOSIS — D69.6 THROMBOCYTOPENIA, UNSPECIFIED (CMS-HCC): ICD-10-CM

## 2023-10-27 DIAGNOSIS — E78.5 BORDERLINE HYPERLIPIDEMIA: ICD-10-CM

## 2023-10-27 DIAGNOSIS — E03.9 ACQUIRED HYPOTHYROIDISM: ICD-10-CM

## 2023-10-27 DIAGNOSIS — R63.4 WEIGHT LOSS, ABNORMAL: ICD-10-CM

## 2023-10-27 DIAGNOSIS — R51.9 CHRONIC DAILY HEADACHE: Primary | ICD-10-CM

## 2023-10-27 PROCEDURE — 3079F DIAST BP 80-89 MM HG: CPT | Performed by: NURSE PRACTITIONER

## 2023-10-27 PROCEDURE — 99214 OFFICE O/P EST MOD 30 MIN: CPT | Performed by: NURSE PRACTITIONER

## 2023-10-27 PROCEDURE — 3077F SYST BP >= 140 MM HG: CPT | Performed by: NURSE PRACTITIONER

## 2023-10-27 PROCEDURE — 1160F RVW MEDS BY RX/DR IN RCRD: CPT | Performed by: NURSE PRACTITIONER

## 2023-10-27 PROCEDURE — 1125F AMNT PAIN NOTED PAIN PRSNT: CPT | Performed by: NURSE PRACTITIONER

## 2023-10-27 PROCEDURE — 1159F MED LIST DOCD IN RCRD: CPT | Performed by: NURSE PRACTITIONER

## 2023-10-27 PROCEDURE — 1036F TOBACCO NON-USER: CPT | Performed by: NURSE PRACTITIONER

## 2023-10-27 NOTE — PROGRESS NOTES
Subjective    Lis Zeng is a 73 y.o. female who presents for Transfer Of Care (From Dr. Em), Follow-up, and Flu Vaccine (Already received).    HPI  She presents to the office today with her  to establish care. He provides much of the information today as cognitively she is forgetful and not able to. She often gets lost in conversation and asks the same questions. She also gets agitated and irritable that she cannot follow in conversation. She reports she is feeling pretty normal today.  Today she reports she has head pressure but no severe headache.  She suffers from chronic headaches.   She has recently seen neurology Dr. Byrne (neurology)  She has chronic headaches. She had an MRI in 2021 for headaches and memory loss. She was alternating fiorinal and fiorinal with codeine for several years. Generally was taking Fiorinal with codeine more often -4 times a day usually. Neurology advised to wean off of it. Recommended today to slowly decrease the dose to avoid withdrawal. Per  she has not taken this in 4 days and has done well.  reports she still has about 10 pills left.  Neurology gave her a short supply of fiorinal and started her on Propranolol for her headaches.  reports so far she has been doing well with this change. He has not had any issues and she seems to be doing pretty well.  OARRS was checked- she filled 45 tablets of the Fiorinal from Dr. Byrne and had 180 tablets filled at the end of September in addition to (which the  reports they were usually rarely.) Generally used the Fiorinal with codeine- he reports 10 tablets left.   Discussed we need to focus on weaning and ultimately stopping the use of these agents but want to do so safely as they should not be stopped abruptly.     reports she suffers from dementia which runs in her family. This has been going on for a few years and seems to be getting worse.   This was also discussed at visit with  neurology.   He may be interested in seeing a geriatrician.      (+) pain in tailbone and lower back. Prior x-ray shows degenerative changes in bilateral SI.  Has a hard time sitting for a while.  This has been present for a while. We discussed working with PT but  reports she often is not open to doing it.   reports she often does not leave the house.  No numbness/tingling/weakness.  No incontinence of urine or stool.    She also has chronic abdominal pain. (+) pain when laying on left side.  No significant diarrhea or constipation per report.  Feels she has to go but she cannot.  Does not eat much food.  Has been losing weight over the past couple years.  She has followed with GI Dr. Tovar and had extensive workup for pain and weight loss (labs, Colonoscopy, CT)    No chest pain, shortness of breath, palpitations or edema. No  numbness, tingling, weakness or vision changes.  No dizziness.    We also discussed today that she is on Estrogen. She had a hysterectomy at age 41- discussed at this point we should start weaning the estrogen slowly as well.  Last labs:   Lab Results   Component Value Date    GLUCOSE 90 06/20/2023    CALCIUM 10.4 06/20/2023     06/20/2023    K 3.7 06/20/2023    CO2 33 (H) 06/20/2023     06/20/2023    BUN 17 06/20/2023    CREATININE 0.92 06/20/2023      Lab Results   Component Value Date    WBC 4.5 06/20/2023    HGB 12.2 06/20/2023    HCT 37.0 06/20/2023     06/20/2023     (L) 06/20/2023    .last    Review of Systems   Constitutional:  Negative for chills, fatigue and fever.   Eyes:  Negative for visual disturbance.   Respiratory:  Negative for cough, chest tightness and shortness of breath.    Cardiovascular:  Negative for chest pain, palpitations and leg swelling.   Gastrointestinal:  Positive for abdominal pain and constipation. Negative for diarrhea, nausea and vomiting.   Neurological:  Positive for headaches. Negative for dizziness, weakness and  numbness.   Psychiatric/Behavioral:  Positive for confusion.        Objective   /88   Pulse 71   Temp 36.6 °C (97.9 °F)   Wt 48.4 kg (106 lb 12.8 oz)   SpO2 98%   BMI 18.33 kg/m²     Physical Exam  Constitutional:       General: She is not in acute distress.     Appearance: Normal appearance. She is not toxic-appearing.   Eyes:      Extraocular Movements: Extraocular movements intact.      Conjunctiva/sclera: Conjunctivae normal.      Pupils: Pupils are equal, round, and reactive to light.   Neck:      Vascular: No carotid bruit.   Cardiovascular:      Rate and Rhythm: Normal rate and regular rhythm.      Pulses: Normal pulses.      Heart sounds: Normal heart sounds, S1 normal and S2 normal. No murmur heard.  Pulmonary:      Effort: Pulmonary effort is normal. No respiratory distress.      Breath sounds: Normal breath sounds.   Abdominal:      General: Abdomen is flat. Bowel sounds are normal.      Palpations: Abdomen is soft.      Tenderness: There is no abdominal tenderness.   Musculoskeletal:      Cervical back: Normal range of motion.      Right lower leg: No edema.      Left lower leg: No edema.   Lymphadenopathy:      Cervical: No cervical adenopathy.   Neurological:      Mental Status: She is alert. She is disoriented.   Psychiatric:         Attention and Perception: She is inattentive.         Mood and Affect: Mood and affect normal.         Behavior: Behavior normal. Behavior is cooperative.         Thought Content: Thought content normal.         Cognition and Memory: Cognition is impaired. Memory is impaired.         Judgment: Judgment normal.       Assessment/Plan   Problem List Items Addressed This Visit       Acquired hypothyroidism     Needs TSH level checked- historically has been low which may be contributing to weight loss.         Hypothyroidism (acquired)    Relevant Orders    TSH with reflex to Free T4 if abnormal    Abdominal pain     Chronic and has been worked up over the past  couple of years.         Borderline hyperlipidemia     Check FLP         Hypertension - Primary     Well controlled on current medications- recheck today was good.         Relevant Orders    Basic Metabolic Panel    Lipid Panel    Thrombocytopenia, unspecified (CMS/HCC)     Chronic for past 2-3 years. Recheck CBC. If remains low- recommend evaluation by hematology.         Relevant Orders    CBC    Vitamin D deficiency     Has also had Vitamin d excess in the past- recheck level.          Relevant Orders    Vitamin D 25-Hydroxy,Total (for eval of Vitamin D levels)    Weight loss, abnormal     Check TSH level. Also could be due to dementia. Will need close monitoring.         Dementia (CMS/HCC)     Following with neurology- may be interested in geriatric evaluation.         Relevant Orders    Vitamin B12    Chronic daily headache     Reviewed neurology note. Will need to wean off fiorinal with codeine slowly and safely.  reports they have 10 tablets left.   He seems to be open and on board today with the recommendation presented by neurology and at visit today.            It has been a pleasure seeing you today!

## 2023-10-28 PROBLEM — E78.2 MIXED HYPERLIPIDEMIA: Status: RESOLVED | Noted: 2023-06-13 | Resolved: 2023-10-28

## 2023-10-28 PROBLEM — I10 PRIMARY HYPERTENSION: Status: RESOLVED | Noted: 2023-06-13 | Resolved: 2023-10-28

## 2023-10-28 PROBLEM — H61.23 HEARING LOSS OF BOTH EARS DUE TO CERUMEN IMPACTION: Status: RESOLVED | Noted: 2023-09-23 | Resolved: 2023-10-28

## 2023-10-28 ASSESSMENT — ENCOUNTER SYMPTOMS
CHEST TIGHTNESS: 0
NAUSEA: 0
WEAKNESS: 0
ABDOMINAL PAIN: 1
NUMBNESS: 0
CHILLS: 0
HEADACHES: 1
PALPITATIONS: 0
DIZZINESS: 0
SHORTNESS OF BREATH: 0
CONSTIPATION: 1
VOMITING: 0
FEVER: 0
COUGH: 0
FATIGUE: 0
CONFUSION: 1
DIARRHEA: 0

## 2023-10-28 NOTE — ASSESSMENT & PLAN NOTE
Reviewed neurology note. Will need to wean off fiorinal with codeine slowly and safely.  reports they have 10 tablets left.   He seems to be open and on board today with the recommendation presented by neurology and at visit today.

## 2023-10-31 ENCOUNTER — TELEPHONE (OUTPATIENT)
Dept: NEUROLOGY | Facility: HOSPITAL | Age: 73
End: 2023-10-31
Payer: MEDICARE

## 2023-10-31 DIAGNOSIS — R51.9 CHRONIC DAILY HEADACHE: Primary | ICD-10-CM

## 2023-10-31 RX ORDER — SUMATRIPTAN 50 MG/1
50 TABLET, FILM COATED ORAL ONCE AS NEEDED
Qty: 9 TABLET | Refills: 0 | Status: SHIPPED | OUTPATIENT
Start: 2023-10-31 | End: 2023-12-11 | Stop reason: ALTCHOICE

## 2023-10-31 NOTE — TELEPHONE ENCOUNTER
Spouse called stated wife has had migraine for last 2 day about 8-9 out of 10 on pain scale. They are using Propranolol. And started to decrease Butalbital like you asked. However did use Butalbital 2 in the last 24 hours. What else can he do for her?

## 2023-10-31 NOTE — TELEPHONE ENCOUNTER
Spouse wants to try Sumatriptan now knowing that maybe may not help right now with this h/a since its been going on for 2 days. He will try Propranolol 20 mg BID to see if she tolerates it by doubling dose currently, if tolerates will call to get a script sent in.

## 2023-11-01 NOTE — TELEPHONE ENCOUNTER
"Spouse called again today about headache, he did not  the Imitrex yet to try. Had increased the Propranolol to 20 mg BID today. Still with headache. I told him to try Imitrex today see if it helps. He seems frustrated with wife \"because she has dementia and isn't listening to him\". I don't think there is anything else that can be done at this point unless you have any suggestions?  "

## 2023-11-02 DIAGNOSIS — R23.2 HOT FLASHES: Primary | ICD-10-CM

## 2023-11-06 DIAGNOSIS — M11.9 CRYSTAL ARTHROPATHIES: Primary | ICD-10-CM

## 2023-11-06 RX ORDER — COLCHICINE 0.6 MG/1
0.6 TABLET ORAL DAILY
Qty: 90 TABLET | Refills: 1 | Status: SHIPPED | OUTPATIENT
Start: 2023-11-06 | End: 2023-12-29 | Stop reason: WASHOUT

## 2023-11-07 ENCOUNTER — TELEPHONE (OUTPATIENT)
Dept: NEUROLOGY | Facility: HOSPITAL | Age: 73
End: 2023-11-07
Payer: MEDICARE

## 2023-11-07 DIAGNOSIS — R51.9 CHRONIC DAILY HEADACHE: ICD-10-CM

## 2023-11-07 RX ORDER — PROPRANOLOL HYDROCHLORIDE 20 MG/1
20 TABLET ORAL 2 TIMES DAILY
Qty: 60 TABLET | Refills: 1 | Status: SHIPPED | OUTPATIENT
Start: 2023-11-07 | End: 2023-11-07 | Stop reason: SDUPTHER

## 2023-11-07 RX ORDER — PROPRANOLOL HYDROCHLORIDE 20 MG/1
20 TABLET ORAL 2 TIMES DAILY
Qty: 180 TABLET | Refills: 0 | Status: SHIPPED | OUTPATIENT
Start: 2023-11-07 | End: 2023-12-27 | Stop reason: ALTCHOICE

## 2023-11-07 NOTE — TELEPHONE ENCOUNTER
Spouse called said that patient seems to be tolerating Propranolol 20 mg BID and headache better. Will need script sent to [facility].

## 2023-11-27 ENCOUNTER — APPOINTMENT (OUTPATIENT)
Dept: GASTROENTEROLOGY | Facility: CLINIC | Age: 73
End: 2023-11-27
Payer: MEDICARE

## 2023-12-06 ENCOUNTER — OFFICE VISIT (OUTPATIENT)
Dept: NEUROLOGY | Facility: HOSPITAL | Age: 73
End: 2023-12-06
Payer: MEDICARE

## 2023-12-06 ENCOUNTER — TELEPHONE (OUTPATIENT)
Dept: PRIMARY CARE | Facility: CLINIC | Age: 73
End: 2023-12-06
Payer: MEDICARE

## 2023-12-06 VITALS — SYSTOLIC BLOOD PRESSURE: 154 MMHG | DIASTOLIC BLOOD PRESSURE: 112 MMHG | HEART RATE: 62 BPM

## 2023-12-06 DIAGNOSIS — M25.474 BILATERAL SWELLING OF FEET AND ANKLES: ICD-10-CM

## 2023-12-06 DIAGNOSIS — F03.918 DEMENTIA WITH OTHER BEHAVIORAL DISTURBANCE, UNSPECIFIED DEMENTIA SEVERITY, UNSPECIFIED DEMENTIA TYPE (MULTI): ICD-10-CM

## 2023-12-06 DIAGNOSIS — M25.472 BILATERAL SWELLING OF FEET AND ANKLES: ICD-10-CM

## 2023-12-06 DIAGNOSIS — M25.471 BILATERAL SWELLING OF FEET AND ANKLES: ICD-10-CM

## 2023-12-06 DIAGNOSIS — M25.475 BILATERAL SWELLING OF FEET AND ANKLES: ICD-10-CM

## 2023-12-06 DIAGNOSIS — R51.9 CHRONIC DAILY HEADACHE: Primary | ICD-10-CM

## 2023-12-06 PROCEDURE — 1036F TOBACCO NON-USER: CPT | Performed by: PSYCHIATRY & NEUROLOGY

## 2023-12-06 PROCEDURE — 1159F MED LIST DOCD IN RCRD: CPT | Performed by: PSYCHIATRY & NEUROLOGY

## 2023-12-06 PROCEDURE — 99214 OFFICE O/P EST MOD 30 MIN: CPT | Performed by: PSYCHIATRY & NEUROLOGY

## 2023-12-06 PROCEDURE — 1160F RVW MEDS BY RX/DR IN RCRD: CPT | Performed by: PSYCHIATRY & NEUROLOGY

## 2023-12-06 PROCEDURE — 3077F SYST BP >= 140 MM HG: CPT | Performed by: PSYCHIATRY & NEUROLOGY

## 2023-12-06 PROCEDURE — 1157F ADVNC CARE PLAN IN RCRD: CPT | Performed by: PSYCHIATRY & NEUROLOGY

## 2023-12-06 PROCEDURE — 3080F DIAST BP >= 90 MM HG: CPT | Performed by: PSYCHIATRY & NEUROLOGY

## 2023-12-06 PROCEDURE — 1125F AMNT PAIN NOTED PAIN PRSNT: CPT | Performed by: PSYCHIATRY & NEUROLOGY

## 2023-12-06 ASSESSMENT — ENCOUNTER SYMPTOMS
LOSS OF SENSATION IN FEET: 0
OCCASIONAL FEELINGS OF UNSTEADINESS: 0
DEPRESSION: 0

## 2023-12-06 ASSESSMENT — PAIN SCALES - GENERAL: PAINLEVEL: 5

## 2023-12-06 NOTE — PROGRESS NOTES
"In-clinic visit    Visit type: follow up visit     PCP: Tona Rizzo, APRN-CNP.    Subjective     Lis Zeng is a 73 y.o. year old right handed female last seen 10/23/23.    Patient is accompanied by: spouse.     HPI    I first saw her 10/23/23 accompanied by spouse. Most of history is from spouse. Pt says something, but spouse appears to be validating perhaps only 50-75% of what she's saying is true. Below is best story I am able to get. Referred for HA. Here states has HA and memory issues. For 15-20 years now, has had \"standard headache\".  HA always in the crown of head, then goes down the neck. Non-throbbing. Aching. (+) light sensitivity in past, not much now. (-) sound sensitivity. Hx migraines when younger. (+) nausea in past, not much now. (-) vomiting. States she has a HA daily. Spouse describes it as a persistent HA that fluctuates in intensity. He knows she's in pain because she tells him. On Fiorinal-codeine x ~10 years. Currently that is the medicine of choice. States no head imaging, but I saw an MRI brain done 11/2021, ordered by PCP, for memory loss and HA, that did not show any chiari, no tumor, no hydrocephalus, with some vol loss. When asked, spouse states she had seen at least 2 other neurologists in the past (? when), was told to cut down on pain pill \"cold turkey\" and she \"didn't want to do that\" and \"had no will power to do that\". In past, tried Zomig tab, sometimes triptan injection. Aspirin. No other medication tried. Pt had been under the care of PCP for years--PCP recently retired, not pt referred to neurology. No asthma. No known heart issues. No glaucoma. No kidney stones. Pt states she finished grad school, but spouse clarified she didn't. She did finish college. Regarding memory issues, review of PCP notes reveal she is believed to have progressive dementia. Med list includes donepezil. Per spouse, family has strong history of it. Dementia in father and sister. Uncles have " "had it. \"Hasn't been a problem\" per spouse until about last 1-2 years, had been more noticeable. \"General cognition may be a problem\". She couldn't remember this appointment, despite spouse reminder of \"25 times\" about this appointment. She has not been driving since at least 2015 when spouse retired--spouse had been happy to drive. However, these days she would not even remember how to get home so spouse would not have trusted her to drive these days. Lives with spouse. Spouse now cooking. Bill payment spouse has been doing. Doesn't remember something just spoken about. Pt states she can't remember what she did for work before. Per spouse, she finished college. 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 Traveled a lot. Then moved to [place], worked for [employer]. It is in this context of cognitive issues, that spouse states it has been \"contentious\" trying to get her off medication, as she complains of headache, spouse aware of prior recommendations to wean off pain pill, but unable to because \"she asks for it\". It appears she is being prescribed about 120 pills of ASA-butalb-caff-cod #3 that she fills each month. Spouse says they only have 4 pills left right now. No smoking. No alcohol. No street drug use/marijuana use.     During last visit, I made it clear to both of them that she is taking too much of the pain medication, that I will need to limit prescription and wean her down. Due to her stated cognitive situation, I am not even sure if she truly has HA or if she has HA as often as she states to  she has. Discussed possible analgesic rebound HA. Also started propranolol 20mg bid as preventative. I also advised due to behavior issues in conjunction with the stated cognitive issues, I recommend they consider seeing geriatric psychiatry.    Here today for follow-up.    States \"HA is good\", not gone. Spouse states she \"hasn't been complaining a lot\".     Hasn't taken sumatriptan or Fioricet in some time.    On " propranolol 20mg bid, not having issues. No SE.    Coincidentally, she has developed some swelling in B ankles/feet in last few days only. No other new meds. Premarin being tapered off. Denies known heart failure or known heart issues.    Has not seen, or has appointment to see geriatric psychiatry. Spouse states he has called around and no one will see her.    Patient Active Problem List   Diagnosis    Acquired hypothyroidism    Gastroesophageal reflux disease without esophagitis    Situational depression    Goiter, nodular    History of hyperparathyroidism    Pelvic pain in female    Right bundle branch block (RBBB)    Hypothyroidism (acquired)    GERD (gastroesophageal reflux disease)    Abdominal pain    Anxiety and depression    Arthritis    Bilateral tinnitus    Borderline hyperlipidemia    Dilated bile duct    Elevated alkaline phosphatase level    Hypertension    Long term (current) use of opiate analgesic    Dysphagia, pharyngoesophageal phase    Thrombocytopenia, unspecified (CMS/[facility])    Vitamin D deficiency    Weight loss, abnormal    Dementia (CMS/HCC)    Chronic daily headache    Continuous overuse of medication       Past Medical History:   Diagnosis Date    Encounter for general adult medical examination without abnormal findings 01/20/2021    Medicare annual wellness visit, initial    Migraine without aura 06/13/2023    Other conditions influencing health status     Crystalline Arthropathies    Pelvic and perineal pain 07/29/2021    Pelvic pain in female    Personal history of other diseases of the female genital tract     History of endometriosis    Personal history of other diseases of the musculoskeletal system and connective tissue     History of pseudogout    Personal history of other diseases of the musculoskeletal system and connective tissue     History of fibromyositis    Personal history of other diseases of the nervous system and sense organs     History of migraine    Personal history of  other endocrine, nutritional and metabolic disease     History of hyperparathyroidism     Past Surgical History:   Procedure Laterality Date    BREAST BIOPSY  10/31/2017    Biopsy Breast Open    CHOLECYSTECTOMY  10/10/2012    Cholecystectomy    OTHER SURGICAL HISTORY  10/10/2012    Parathyroid Surgery    TOTAL ABDOMINAL HYSTERECTOMY W/ BILATERAL SALPINGOOPHORECTOMY  10/31/2017    Total Abdominal Hysterectomy With Removal Of Both Ovaries     Social History     Tobacco Use    Smoking status: Never     Passive exposure: Past    Smokeless tobacco: Never   Substance Use Topics    Alcohol use: Not Currently     family history includes Alzheimer's disease in her father and another family member; Colon cancer in her mother and another family member; Diabetes in her mother, sister, and another family member; Paget's disease of bone in an other family member; Pemphigus vulgaris in an other family member; Pneumonia in an other family member.    Allergies   Allergen Reactions    Penicillins Other, Hives and Nausea Only    Sulfa (Sulfonamide Antibiotics) Nausea/vomiting       Current Outpatient Medications:     amLODIPine (Norvasc) 10 mg tablet, Take 1 tablet (10 mg) by mouth once daily. for blood pressure  taking 1/2 pill, Disp: 90 tablet, Rfl: 1    colchicine 0.6 mg tablet, Take 1 tablet (0.6 mg) by mouth once daily. AS DIRECTED, Disp: 90 tablet, Rfl: 1    donepezil (Aricept) 10 mg tablet, Take 1 tablet (10 mg) by mouth once daily., Disp: 90 tablet, Rfl: 1    ergocalciferol (Vitamin D-2) 1.25 MG (05499 UT) capsule, 1 (one) time per week., Disp: , Rfl:     estrogens, conjugated, (Premarin) 0.625 mg tablet, Take one tablet by mouth daily for one month, then take one tablet by mouth every other day for 1 month, then stop., Disp: 45 tablet, Rfl: 0    levothyroxine (Synthroid, Levoxyl) 125 mcg tablet, Take 1 tablet (125 mcg) by mouth once daily. (Patient taking differently: Take 1 tablet (125 mcg) by mouth. 5 days/week), Disp: 90  tablet, Rfl: 1    lisinopril 20 mg tablet, Take 1 tablet (20 mg) by mouth once daily. as directed, Disp: , Rfl:     multivitamin tablet, Take 1 tablet by mouth once daily., Disp: , Rfl:     propranolol (Inderal) 20 mg tablet, Take 1 tablet (20 mg) by mouth 2 times a day., Disp: 180 tablet, Rfl: 0    codeine-butalbital-ASA-caff (Fiorinal w/ Codeine) 57--40 mg capsule, Take 1 capsule by mouth every 6 hours if needed for headaches., Disp: , Rfl:     loperamide (Imodium A-D) 2 mg capsule, Take by mouth., Disp: , Rfl:     SUMAtriptan (Imitrex) 50 mg tablet, Take 1 tablet (50 mg) by mouth 1 time if needed for migraine (take within 15 minutes of migraine onset; take no more than 2-3 a week). May repeat dose once in 2 hours if no relief.  Do not exceed 2 doses in 24 hours., Disp: 9 tablet, Rfl: 0    Objective     BP (!) 154/112   Pulse 62     Awake, alert, oriented, in no distress  Well-nourished, ambulatory independently  No leg edema    Pt appears calmer today    Mental status exam as above, conversant   Full EOMs intact, no nystagmus, no ptosis   No facial droop   Hearing grossly intact   No dysarthria    Normal gait       Assessment/Plan   Problem List Items Addressed This Visit             ICD-10-CM    Dementia (CMS/[facility]) F03.90     Dementia, unknown type, poss Alzheimer's vs other, appears to have behavioral issues  ? hallucinations  No SLUMS/MoCA  On donepezil 10mg daily per med list  Discussed referral to geriatric psychiatry due to behavior issues  Will refer to  memory clinic for further eval/management         Relevant Orders    Referral to Neurology    Chronic daily headache - Primary R51.9     Pain in crown of head going down to neck  Not compatible with migraine or tension HA  No features on exam to suggest cervicogenic  Cannot exclude possibility of analgesic overuse HA or transformed headache  Unsure if truly has pain, as bad and as often as she states, given underlying dementia  Brain imaging  findings were also discussed from 2021  Pt taking up to 5-6 tabs of Fioricet-codeine A DAY, was taking about 2-3 A DAY in past  I previously discussed weaning down/off pain meds and started propranolol    Interestingly, today reports pt not complaining of HA much and taking minimal pain med    On propranolol 20mg bid--discussed try to wean down since HA better, and since having BLE swelling ? Etiology  On Fioricet (no codeine)--still has leftover pills--no rx given today          Relevant Orders    Follow Up In Neurology    Bilateral swelling of feet and ankles M25.471, M25.474, M25.475, M25.472     Discussed, I don't see how tapering her down/off her Fioricet-codeine and Fioricet could bring this on  She is on amlodipine, but amlodipine is new--swelling is new  She has been on propranolol new from when I saw her in Oct 2023, but she has no known heart issues, no OLMSTEAD, no known heart failure, and swelling is new just in last few days  Advised see PCP          Plans:  Lower propranolol to 10mg 2x/day x2 weeks, then stop  Refer to  memory clinic ([place]) for further eval/management of dementia/behavior issues  Continue donepezil  See PCP for ankle/foot swelling    Rtc 6 mo    All questions were answered.  Pt knows how to contact my office in case pt has any questions or concerns.    Kemar Byrne MD

## 2023-12-06 NOTE — PATIENT INSTRUCTIONS
Lower propranolol to 10mg 2x/day x2 weeks, then stop  Refer to  memory clinic ([place]) for further eval/management of dementia/behavior issues  Continue donepezil  See PCP for ankle/foot swelling    Rtc 6 mo

## 2023-12-06 NOTE — TELEPHONE ENCOUNTER
"Pt  Marcial is calling in. He said she was just at the neurologist and her BP was \"140 something/ 117\" and stated she needs to be seen soon. Both her ankles are swelling. Please advise  "

## 2023-12-06 NOTE — LETTER
"December 8, 2023     LIYAH Varela  [address]  [facility]  [facility] [address]    Patient: Lis Zeng   YOB: 1950   Date of Visit: 12/6/2023       Dear LIYAH Sepulveda:    Thank you for referring Lis Zeng to me for evaluation. Below are my notes for this consultation.  If you have questions, please do not hesitate to call me. I look forward to following your patient along with you.       Sincerely,     Kemar Byrne MD      CC: No Recipients  ______________________________________________________________________________________    In-clinic visit    Visit type: follow up visit     PCP: LIYAH Varela.    Subjective     Lis Zeng is a 73 y.o. year old right handed female last seen 10/23/23.    Patient is accompanied by: spouse.     HPI    I first saw her 10/23/23 accompanied by spouse. Most of history is from spouse. Pt says something, but spouse appears to be validating perhaps only 50-75% of what she's saying is true. Below is best story I am able to get. Referred for HA. Here states has HA and memory issues. For 15-20 years now, has had \"standard headache\".  HA always in the crown of head, then goes down the neck. Non-throbbing. Aching. (+) light sensitivity in past, not much now. (-) sound sensitivity. Hx migraines when younger. (+) nausea in past, not much now. (-) vomiting. States she has a HA daily. Spouse describes it as a persistent HA that fluctuates in intensity. He knows she's in pain because she tells him. On Fiorinal-codeine x ~10 years. Currently that is the medicine of choice. States no head imaging, but I saw an MRI brain done 11/2021, ordered by PCP, for memory loss and HA, that did not show any chiari, no tumor, no hydrocephalus, with some vol loss. When asked, spouse states she had seen at least 2 other neurologists in the past (? when), was told to cut down on pain pill \"cold turkey\" and she \"didn't " "want to do that\" and \"had no will power to do that\". In past, tried Zomig tab, sometimes triptan injection. Aspirin. No other medication tried. Pt had been under the care of PCP for years--PCP recently retired, not pt referred to neurology. No asthma. No known heart issues. No glaucoma. No kidney stones. Pt states she finished grad school, but spouse clarified she didn't. She did finish college. Regarding memory issues, review of PCP notes reveal she is believed to have progressive dementia. Med list includes donepezil. Per spouse, family has strong history of it. Dementia in father and sister. Uncles have had it. \"Hasn't been a problem\" per spouse until about last 1-2 years, had been more noticeable. \"General cognition may be a problem\". She couldn't remember this appointment, despite spouse reminder of \"25 times\" about this appointment. She has not been driving since at least 2015 when spouse retired--spouse had been happy to drive. However, these days she would not even remember how to get home so spouse would not have trusted her to drive these days. Lives with spouse. Spouse now cooking. Bill payment spouse has been doing. Doesn't remember something just spoken about. Pt states she can't remember what she did for work before. Per spouse, she finished college. 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 Traveled a lot. 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 It is in this context of cognitive issues, that spouse states it has been \"contentious\" trying to get her off medication, as she complains of headache, spouse aware of prior recommendations to wean off pain pill, but unable to because \"she asks for it\". It appears she is being prescribed about 120 pills of ASA-butalb-caff-cod #3 that she fills each month. Spouse says they only have 4 pills left right now. No smoking. No alcohol. No street drug use/marijuana use.     During last visit, I made it clear to both of them that she is taking too much of the pain " "medication, that I will need to limit prescription and wean her down. Due to her stated cognitive situation, I am not even sure if she truly has HA or if she has HA as often as she states to  she has. Discussed possible analgesic rebound HA. Also started propranolol 20mg bid as preventative. I also advised due to behavior issues in conjunction with the stated cognitive issues, I recommend they consider seeing geriatric psychiatry.    Here today for follow-up.    States \"HA is good\", not gone. Spouse states she \"hasn't been complaining a lot\".     Hasn't taken sumatriptan or Fioricet in some time.    On propranolol 20mg bid, not having issues. No SE.    Coincidentally, she has developed some swelling in B ankles/feet in last few days only. No other new meds. Premarin being tapered off. Denies known heart failure or known heart issues.    Has not seen, or has appointment to see geriatric psychiatry. Spouse states he has called around and no one will see her.    Patient Active Problem List   Diagnosis   • Acquired hypothyroidism   • Gastroesophageal reflux disease without esophagitis   • Situational depression   • Goiter, nodular   • History of hyperparathyroidism   • Pelvic pain in female   • Right bundle branch block (RBBB)   • Hypothyroidism (acquired)   • GERD (gastroesophageal reflux disease)   • Abdominal pain   • Anxiety and depression   • Arthritis   • Bilateral tinnitus   • Borderline hyperlipidemia   • Dilated bile duct   • Elevated alkaline phosphatase level   • Hypertension   • Long term (current) use of opiate analgesic   • Dysphagia, pharyngoesophageal phase   • Thrombocytopenia, unspecified (CMS/HCC)   • Vitamin D deficiency   • Weight loss, abnormal   • Dementia (CMS/HCC)   • Chronic daily headache   • Continuous overuse of medication       Past Medical History:   Diagnosis Date   • Encounter for general adult medical examination without abnormal findings 01/20/2021    Medicare annual wellness " visit, initial   • Migraine without aura 06/13/2023   • Other conditions influencing health status     Crystalline Arthropathies   • Pelvic and perineal pain 07/29/2021    Pelvic pain in female   • Personal history of other diseases of the female genital tract     History of endometriosis   • Personal history of other diseases of the musculoskeletal system and connective tissue     History of pseudogout   • Personal history of other diseases of the musculoskeletal system and connective tissue     History of fibromyositis   • Personal history of other diseases of the nervous system and sense organs     History of migraine   • Personal history of other endocrine, nutritional and metabolic disease     History of hyperparathyroidism     Past Surgical History:   Procedure Laterality Date   • BREAST BIOPSY  10/31/2017    Biopsy Breast Open   • CHOLECYSTECTOMY  10/10/2012    Cholecystectomy   • OTHER SURGICAL HISTORY  10/10/2012    Parathyroid Surgery   • TOTAL ABDOMINAL HYSTERECTOMY W/ BILATERAL SALPINGOOPHORECTOMY  10/31/2017    Total Abdominal Hysterectomy With Removal Of Both Ovaries     Social History     Tobacco Use   • Smoking status: Never     Passive exposure: Past   • Smokeless tobacco: Never   Substance Use Topics   • Alcohol use: Not Currently     family history includes Alzheimer's disease in her father and another family member; Colon cancer in her mother and another family member; Diabetes in her mother, sister, and another family member; Paget's disease of bone in an other family member; Pemphigus vulgaris in an other family member; Pneumonia in an other family member.    Allergies   Allergen Reactions   • Penicillins Other, Hives and Nausea Only   • Sulfa (Sulfonamide Antibiotics) Nausea/vomiting       Current Outpatient Medications:   •  amLODIPine (Norvasc) 10 mg tablet, Take 1 tablet (10 mg) by mouth once daily. for blood pressure  taking 1/2 pill, Disp: 90 tablet, Rfl: 1  •  colchicine 0.6 mg tablet,  Take 1 tablet (0.6 mg) by mouth once daily. AS DIRECTED, Disp: 90 tablet, Rfl: 1  •  donepezil (Aricept) 10 mg tablet, Take 1 tablet (10 mg) by mouth once daily., Disp: 90 tablet, Rfl: 1  •  ergocalciferol (Vitamin D-2) 1.25 MG (61343 UT) capsule, 1 (one) time per week., Disp: , Rfl:   •  estrogens, conjugated, (Premarin) 0.625 mg tablet, Take one tablet by mouth daily for one month, then take one tablet by mouth every other day for 1 month, then stop., Disp: 45 tablet, Rfl: 0  •  levothyroxine (Synthroid, Levoxyl) 125 mcg tablet, Take 1 tablet (125 mcg) by mouth once daily. (Patient taking differently: Take 1 tablet (125 mcg) by mouth. 5 days/week), Disp: 90 tablet, Rfl: 1  •  lisinopril 20 mg tablet, Take 1 tablet (20 mg) by mouth once daily. as directed, Disp: , Rfl:   •  multivitamin tablet, Take 1 tablet by mouth once daily., Disp: , Rfl:   •  propranolol (Inderal) 20 mg tablet, Take 1 tablet (20 mg) by mouth 2 times a day., Disp: 180 tablet, Rfl: 0  •  codeine-butalbital-ASA-caff (Fiorinal w/ Codeine) 33--40 mg capsule, Take 1 capsule by mouth every 6 hours if needed for headaches., Disp: , Rfl:   •  loperamide (Imodium A-D) 2 mg capsule, Take by mouth., Disp: , Rfl:   •  SUMAtriptan (Imitrex) 50 mg tablet, Take 1 tablet (50 mg) by mouth 1 time if needed for migraine (take within 15 minutes of migraine onset; take no more than 2-3 a week). May repeat dose once in 2 hours if no relief.  Do not exceed 2 doses in 24 hours., Disp: 9 tablet, Rfl: 0    Objective     BP (!) 154/112   Pulse 62     Awake, alert, oriented, in no distress  Well-nourished, ambulatory independently  No leg edema    Pt appears calmer today    Mental status exam as above, conversant   Full EOMs intact, no nystagmus, no ptosis   No facial droop   Hearing grossly intact   No dysarthria    Normal gait       Assessment/Plan   Problem List Items Addressed This Visit             ICD-10-CM    Dementia (CMS/[facility]) F03.90     Dementia, unknown  type, poss Alzheimer's vs other, appears to have behavioral issues  ? hallucinations  No SLUMS/MoCA  On donepezil 10mg daily per med list  Discussed referral to geriatric psychiatry due to behavior issues  Will refer to  memory clinic for further eval/management         Relevant Orders    Referral to Neurology    Chronic daily headache - Primary R51.9     Pain in crown of head going down to neck  Not compatible with migraine or tension HA  No features on exam to suggest cervicogenic  Cannot exclude possibility of analgesic overuse HA or transformed headache  Unsure if truly has pain, as bad and as often as she states, given underlying dementia  Brain imaging findings were also discussed from 2021  Pt taking up to 5-6 tabs of Fioricet-codeine A DAY, was taking about 2-3 A DAY in past  I previously discussed weaning down/off pain meds and started propranolol    Interestingly, today reports pt not complaining of HA much and taking minimal pain med    On propranolol 20mg bid--discussed try to wean down since HA better, and since having BLE swelling ? Etiology  On Fioricet (no codeine)--still has leftover pills--no rx given today          Relevant Orders    Follow Up In Neurology    Bilateral swelling of feet and ankles M25.471, M25.474, M25.475, M25.472     Discussed, I don't see how tapering her down/off her Fioricet-codeine and Fioricet could bring this on  She is on amlodipine, but amlodipine is new--swelling is new  She has been on propranolol new from when I saw her in Oct 2023, but she has no known heart issues, no OLMSTEAD, no known heart failure, and swelling is new just in last few days  Advised see PCP          Plans:  Lower propranolol to 10mg 2x/day x2 weeks, then stop  Refer to  memory clinic ([place]) for further eval/management of dementia/behavior issues  Continue donepezil  See PCP for ankle/foot swelling    Rtc 6 mo    All questions were answered.  Pt knows how to contact my office in case pt has any  questions or concerns.    Kemar Byrne MD

## 2023-12-08 PROBLEM — M25.472 BILATERAL SWELLING OF FEET AND ANKLES: Status: ACTIVE | Noted: 2023-12-08

## 2023-12-08 PROBLEM — M25.474 BILATERAL SWELLING OF FEET AND ANKLES: Status: ACTIVE | Noted: 2023-12-08

## 2023-12-08 PROBLEM — M25.471 BILATERAL SWELLING OF FEET AND ANKLES: Status: ACTIVE | Noted: 2023-12-08

## 2023-12-08 PROBLEM — M25.475 BILATERAL SWELLING OF FEET AND ANKLES: Status: ACTIVE | Noted: 2023-12-08

## 2023-12-08 NOTE — ASSESSMENT & PLAN NOTE
Pain in crown of head going down to neck  Not compatible with migraine or tension HA  No features on exam to suggest cervicogenic  Cannot exclude possibility of analgesic overuse HA or transformed headache  Unsure if truly has pain, as bad and as often as she states, given underlying dementia  Brain imaging findings were also discussed from 2021  Pt taking up to 5-6 tabs of Fioricet-codeine A DAY, was taking about 2-3 A DAY in past  I previously discussed weaning down/off pain meds and started propranolol    Interestingly, today reports pt not complaining of HA much and taking minimal pain med    On propranolol 20mg bid--discussed try to wean down since HA better, and since having BLE swelling ? Etiology  On Fioricet (no codeine)--still has leftover pills--no rx given today

## 2023-12-08 NOTE — ASSESSMENT & PLAN NOTE
Discussed, I don't see how tapering her down/off her Fioricet-codeine and Fioricet could bring this on  She is on amlodipine, but amlodipine is new--swelling is new  She has been on propranolol new from when I saw her in Oct 2023, but she has no known heart issues, no OLMSTEAD, no known heart failure, and swelling is new just in last few days  Advised see PCP

## 2023-12-08 NOTE — ASSESSMENT & PLAN NOTE
Dementia, unknown type, poss Alzheimer's vs other, appears to have behavioral issues  ? hallucinations  No SLUMS/MoCA  On donepezil 10mg daily per med list  Discussed referral to geriatric psychiatry due to behavior issues  Will refer to  memory clinic for further eval/management

## 2023-12-11 ENCOUNTER — OFFICE VISIT (OUTPATIENT)
Dept: PRIMARY CARE | Facility: CLINIC | Age: 73
End: 2023-12-11
Payer: MEDICARE

## 2023-12-11 VITALS
TEMPERATURE: 98.1 F | HEART RATE: 61 BPM | DIASTOLIC BLOOD PRESSURE: 94 MMHG | WEIGHT: 123.8 LBS | OXYGEN SATURATION: 96 % | BODY MASS INDEX: 21.25 KG/M2 | SYSTOLIC BLOOD PRESSURE: 184 MMHG

## 2023-12-11 DIAGNOSIS — R63.5 WEIGHT GAIN: ICD-10-CM

## 2023-12-11 DIAGNOSIS — I10 HYPERTENSION, UNSPECIFIED TYPE: ICD-10-CM

## 2023-12-11 DIAGNOSIS — E03.9 ACQUIRED HYPOTHYROIDISM: ICD-10-CM

## 2023-12-11 DIAGNOSIS — R60.0 EDEMA OF BOTH LOWER EXTREMITIES: Primary | ICD-10-CM

## 2023-12-11 PROCEDURE — 1159F MED LIST DOCD IN RCRD: CPT | Performed by: NURSE PRACTITIONER

## 2023-12-11 PROCEDURE — 3080F DIAST BP >= 90 MM HG: CPT | Performed by: NURSE PRACTITIONER

## 2023-12-11 PROCEDURE — 3077F SYST BP >= 140 MM HG: CPT | Performed by: NURSE PRACTITIONER

## 2023-12-11 PROCEDURE — 1125F AMNT PAIN NOTED PAIN PRSNT: CPT | Performed by: NURSE PRACTITIONER

## 2023-12-11 PROCEDURE — 99214 OFFICE O/P EST MOD 30 MIN: CPT | Performed by: NURSE PRACTITIONER

## 2023-12-11 PROCEDURE — 1160F RVW MEDS BY RX/DR IN RCRD: CPT | Performed by: NURSE PRACTITIONER

## 2023-12-11 PROCEDURE — 1036F TOBACCO NON-USER: CPT | Performed by: NURSE PRACTITIONER

## 2023-12-11 PROCEDURE — 1157F ADVNC CARE PLAN IN RCRD: CPT | Performed by: NURSE PRACTITIONER

## 2023-12-11 ASSESSMENT — ENCOUNTER SYMPTOMS
WEAKNESS: 0
PALPITATIONS: 0
NAUSEA: 0
COUGH: 0
SHORTNESS OF BREATH: 0
CHEST TIGHTNESS: 0
FEVER: 0
DIARRHEA: 0
VOMITING: 0
DIZZINESS: 0
NUMBNESS: 0
HEADACHES: 0
ABDOMINAL PAIN: 0
FATIGUE: 0
CHILLS: 0

## 2023-12-11 ASSESSMENT — PATIENT HEALTH QUESTIONNAIRE - PHQ9
2. FEELING DOWN, DEPRESSED OR HOPELESS: NOT AT ALL
SUM OF ALL RESPONSES TO PHQ9 QUESTIONS 1 AND 2: 0
1. LITTLE INTEREST OR PLEASURE IN DOING THINGS: NOT AT ALL

## 2023-12-11 NOTE — PATIENT INSTRUCTIONS
Obtain the blood work as ordered tomorrow.  Elevate feet for 30 minutes 4 times during the day.   Low sodium diet.  Stop the Amlodipine for now.  Check BP once a day at home.  Echocardiogram ordered.  Will follow up on blood work to adjust BP medications.

## 2023-12-11 NOTE — PROGRESS NOTES
Subjective   Lis Zeng is a 73 y.o. female who presents for Blood Pressure Check and Joint Swelling (Both ankle swelling).    HPI  She presents to the office today with her  for a follow up for elevated BP and swelling in BLE.   In general says everything hurts. Has a hard time sleeping because both shoulders hurt, her right knee hurts and lower back hurts.  BP was good at the 10/2023 appt, has slowly been trending up.   She is tolerating medications well at current dose- no side effects. No chest pain, shortness of breath, palpitations. No cough.   No OLMSTEAD or orthopnea. Has complained of headache occasionally but overall better per .  No tingling, weakness or vision changes.  No dizziness.  Home blood pressure readings: 158/81    Diet: Overall pretty healthy.  reports eating more as her appetite has improved.  Weight: Weight up several lbs in past few months.Not sure if due to edema or increased appetite.    Last labs: pending.    Review of Systems   Constitutional:  Negative for chills, fatigue and fever.   Eyes:  Negative for visual disturbance.   Respiratory:  Negative for cough, chest tightness and shortness of breath.    Cardiovascular:  Positive for leg swelling. Negative for chest pain and palpitations.   Gastrointestinal:  Negative for abdominal pain, diarrhea, nausea and vomiting.   Neurological:  Negative for dizziness, weakness, numbness and headaches.     Objective   BP (!) 184/94 (BP Location: Left arm, Patient Position: Sitting)   Pulse 61   Temp 36.7 °C (98.1 °F) (Temporal)   Wt 56.2 kg (123 lb 12.8 oz)   SpO2 96%   BMI 21.25 kg/m²     Physical Exam  Constitutional:       General: She is not in acute distress.     Appearance: Normal appearance. She is not toxic-appearing.   Eyes:      Extraocular Movements: Extraocular movements intact.      Conjunctiva/sclera: Conjunctivae normal.      Pupils: Pupils are equal, round, and reactive to light.   Neck:      Vascular: No  carotid bruit.   Cardiovascular:      Rate and Rhythm: Normal rate and regular rhythm.      Pulses: Normal pulses.      Heart sounds: Normal heart sounds, S1 normal and S2 normal. No murmur heard.  Pulmonary:      Effort: Pulmonary effort is normal. No respiratory distress.      Breath sounds: Normal breath sounds.   Abdominal:      General: Bowel sounds are normal.      Palpations: Abdomen is soft.      Tenderness: There is no abdominal tenderness.   Musculoskeletal:      Right lower le+ Pitting Edema present.      Left lower le+ Pitting Edema present.   Lymphadenopathy:      Cervical: No cervical adenopathy.   Neurological:      Mental Status: She is alert and oriented to person, place, and time.   Psychiatric:         Attention and Perception: Attention normal.         Mood and Affect: Mood and affect normal.         Behavior: Behavior normal. Behavior is cooperative.         Thought Content: Thought content normal.         Cognition and Memory: Cognition normal.         Judgment: Judgment normal.         Assessment/Plan   Problem List Items Addressed This Visit       Acquired hypothyroidism     Check TSH level.          Hypertension     Elevated today. Will check labs to determine best medication- may increase Lisinopril to 40 mg.         Edema of both lower extremities - Primary     Check updated labs. Given weight gain as well- check an ECHO.   Stop Amlodipine as this may be the cause.         Relevant Orders    Transthoracic Echo (TTE) Complete    Weight gain    Relevant Orders    Transthoracic Echo (TTE) Complete       It has been a pleasure seeing you today!

## 2023-12-12 ENCOUNTER — LAB (OUTPATIENT)
Dept: LAB | Facility: LAB | Age: 73
End: 2023-12-12
Payer: MEDICARE

## 2023-12-12 ENCOUNTER — APPOINTMENT (OUTPATIENT)
Dept: NEUROLOGY | Facility: HOSPITAL | Age: 73
End: 2023-12-12
Payer: MEDICARE

## 2023-12-12 ENCOUNTER — TELEPHONE (OUTPATIENT)
Dept: PRIMARY CARE | Facility: CLINIC | Age: 73
End: 2023-12-12

## 2023-12-12 DIAGNOSIS — E55.9 VITAMIN D DEFICIENCY: ICD-10-CM

## 2023-12-12 DIAGNOSIS — I10 HYPERTENSION, UNSPECIFIED TYPE: ICD-10-CM

## 2023-12-12 DIAGNOSIS — D69.6 THROMBOCYTOPENIA, UNSPECIFIED (CMS-HCC): ICD-10-CM

## 2023-12-12 DIAGNOSIS — R60.0 EDEMA OF BOTH LOWER EXTREMITIES: Primary | ICD-10-CM

## 2023-12-12 DIAGNOSIS — E87.6 HYPOKALEMIA: ICD-10-CM

## 2023-12-12 DIAGNOSIS — E03.9 HYPOTHYROIDISM (ACQUIRED): ICD-10-CM

## 2023-12-12 DIAGNOSIS — E87.6 HYPOKALEMIA: Primary | ICD-10-CM

## 2023-12-12 DIAGNOSIS — F03.90 DEMENTIA, UNSPECIFIED DEMENTIA SEVERITY, UNSPECIFIED DEMENTIA TYPE, UNSPECIFIED WHETHER BEHAVIORAL, PSYCHOTIC, OR MOOD DISTURBANCE OR ANXIETY (MULTI): ICD-10-CM

## 2023-12-12 LAB
25(OH)D3 SERPL-MCNC: 40 NG/ML (ref 30–100)
ANION GAP SERPL CALC-SCNC: 9 MMOL/L (ref 10–20)
BUN SERPL-MCNC: 13 MG/DL (ref 6–23)
CALCIUM SERPL-MCNC: 9.4 MG/DL (ref 8.6–10.3)
CHLORIDE SERPL-SCNC: 102 MMOL/L (ref 98–107)
CHOLEST SERPL-MCNC: 181 MG/DL (ref 0–199)
CHOLESTEROL/HDL RATIO: 2.3
CO2 SERPL-SCNC: 34 MMOL/L (ref 21–32)
CREAT SERPL-MCNC: 0.76 MG/DL (ref 0.5–1.05)
ERYTHROCYTE [DISTWIDTH] IN BLOOD BY AUTOMATED COUNT: 13.2 % (ref 11.5–14.5)
GFR SERPL CREATININE-BSD FRML MDRD: 84 ML/MIN/1.73M*2
GLUCOSE SERPL-MCNC: 87 MG/DL (ref 74–99)
HCT VFR BLD AUTO: 37.3 % (ref 36–46)
HDLC SERPL-MCNC: 78.1 MG/DL
HGB BLD-MCNC: 12 G/DL (ref 12–16)
LDLC SERPL CALC-MCNC: 93 MG/DL
MCH RBC QN AUTO: 32.1 PG (ref 26–34)
MCHC RBC AUTO-ENTMCNC: 32.2 G/DL (ref 32–36)
MCV RBC AUTO: 100 FL (ref 80–100)
NON HDL CHOLESTEROL: 103 MG/DL (ref 0–149)
NRBC BLD-RTO: 0 /100 WBCS (ref 0–0)
PLATELET # BLD AUTO: 131 X10*3/UL (ref 150–450)
POTASSIUM SERPL-SCNC: 2.8 MMOL/L (ref 3.5–5.3)
RBC # BLD AUTO: 3.74 X10*6/UL (ref 4–5.2)
SODIUM SERPL-SCNC: 142 MMOL/L (ref 136–145)
TRIGL SERPL-MCNC: 52 MG/DL (ref 0–149)
TSH SERPL-ACNC: 1.09 MIU/L (ref 0.44–3.98)
VIT B12 SERPL-MCNC: 338 PG/ML (ref 211–911)
VLDL: 10 MG/DL (ref 0–40)
WBC # BLD AUTO: 5.5 X10*3/UL (ref 4.4–11.3)

## 2023-12-12 PROCEDURE — 82306 VITAMIN D 25 HYDROXY: CPT

## 2023-12-12 PROCEDURE — 36415 COLL VENOUS BLD VENIPUNCTURE: CPT

## 2023-12-12 PROCEDURE — 80061 LIPID PANEL: CPT

## 2023-12-12 PROCEDURE — 80048 BASIC METABOLIC PNL TOTAL CA: CPT

## 2023-12-12 PROCEDURE — 85027 COMPLETE CBC AUTOMATED: CPT

## 2023-12-12 PROCEDURE — 82607 VITAMIN B-12: CPT

## 2023-12-12 PROCEDURE — 84443 ASSAY THYROID STIM HORMONE: CPT

## 2023-12-12 NOTE — ASSESSMENT & PLAN NOTE
Check updated labs. Given weight gain as well- check an ECHO.   Stop Amlodipine as this may be the cause.

## 2023-12-13 ENCOUNTER — LAB (OUTPATIENT)
Dept: LAB | Facility: LAB | Age: 73
End: 2023-12-13
Payer: MEDICARE

## 2023-12-13 DIAGNOSIS — E87.6 HYPOKALEMIA: ICD-10-CM

## 2023-12-13 LAB
ANION GAP SERPL CALC-SCNC: 9 MMOL/L (ref 10–20)
BUN SERPL-MCNC: 10 MG/DL (ref 6–23)
CALCIUM SERPL-MCNC: 10 MG/DL (ref 8.6–10.3)
CHLORIDE SERPL-SCNC: 103 MMOL/L (ref 98–107)
CO2 SERPL-SCNC: 34 MMOL/L (ref 21–32)
CREAT SERPL-MCNC: 0.83 MG/DL (ref 0.5–1.05)
GFR SERPL CREATININE-BSD FRML MDRD: 75 ML/MIN/1.73M*2
GLUCOSE SERPL-MCNC: 92 MG/DL (ref 74–99)
POTASSIUM SERPL-SCNC: 3.1 MMOL/L (ref 3.5–5.3)
SODIUM SERPL-SCNC: 143 MMOL/L (ref 136–145)

## 2023-12-13 PROCEDURE — 36415 COLL VENOUS BLD VENIPUNCTURE: CPT

## 2023-12-13 PROCEDURE — 80048 BASIC METABOLIC PNL TOTAL CA: CPT

## 2023-12-13 NOTE — TELEPHONE ENCOUNTER
Called by on-call line.  Critical potassium 2.8.  Patient asymptomatic.  Discussed case with her .  He declines to go to the emergency room but is able to  potassium chloride  Instructed to take 40 mill equivalents this evening and will get stat labs in the morning.  Follow-up to be determined  PCP made aware

## 2023-12-15 ENCOUNTER — LAB (OUTPATIENT)
Dept: LAB | Facility: LAB | Age: 73
End: 2023-12-15
Payer: MEDICARE

## 2023-12-15 DIAGNOSIS — E87.6 HYPOKALEMIA: Primary | ICD-10-CM

## 2023-12-15 DIAGNOSIS — E87.6 HYPOKALEMIA: ICD-10-CM

## 2023-12-15 DIAGNOSIS — I10 HYPERTENSION, UNSPECIFIED TYPE: ICD-10-CM

## 2023-12-15 DIAGNOSIS — R60.0 EDEMA OF BOTH LOWER EXTREMITIES: ICD-10-CM

## 2023-12-15 LAB
ANION GAP SERPL CALC-SCNC: 9 MMOL/L (ref 10–20)
BUN SERPL-MCNC: 12 MG/DL (ref 6–23)
CALCIUM SERPL-MCNC: 9.9 MG/DL (ref 8.6–10.3)
CHLORIDE SERPL-SCNC: 102 MMOL/L (ref 98–107)
CO2 SERPL-SCNC: 33 MMOL/L (ref 21–32)
CREAT SERPL-MCNC: 0.82 MG/DL (ref 0.5–1.05)
CREAT UR-MCNC: 42.5 MG/DL (ref 20–320)
GFR SERPL CREATININE-BSD FRML MDRD: 77 ML/MIN/1.73M*2
GLUCOSE SERPL-MCNC: 91 MG/DL (ref 74–99)
MICROALBUMIN UR-MCNC: 67.3 MG/L
MICROALBUMIN/CREAT UR: 158.4 UG/MG CREAT
POTASSIUM SERPL-SCNC: 3.4 MMOL/L (ref 3.5–5.3)
SODIUM SERPL-SCNC: 141 MMOL/L (ref 136–145)

## 2023-12-15 PROCEDURE — 80048 BASIC METABOLIC PNL TOTAL CA: CPT

## 2023-12-15 PROCEDURE — 82043 UR ALBUMIN QUANTITATIVE: CPT

## 2023-12-15 PROCEDURE — 36415 COLL VENOUS BLD VENIPUNCTURE: CPT

## 2023-12-15 PROCEDURE — 82570 ASSAY OF URINE CREATININE: CPT

## 2023-12-15 RX ORDER — POTASSIUM CHLORIDE 20 MEQ/1
20 TABLET, EXTENDED RELEASE ORAL DAILY
Qty: 30 TABLET | Refills: 0 | Status: SHIPPED | OUTPATIENT
Start: 2023-12-15 | End: 2024-01-12 | Stop reason: SDUPTHER

## 2023-12-19 ENCOUNTER — APPOINTMENT (OUTPATIENT)
Dept: GASTROENTEROLOGY | Facility: CLINIC | Age: 73
End: 2023-12-19
Payer: MEDICARE

## 2023-12-19 DIAGNOSIS — I50.9 ACUTE HEART FAILURE, UNSPECIFIED HEART FAILURE TYPE (MULTI): Primary | ICD-10-CM

## 2023-12-20 ENCOUNTER — LAB (OUTPATIENT)
Dept: LAB | Facility: LAB | Age: 73
End: 2023-12-20
Payer: MEDICARE

## 2023-12-20 ENCOUNTER — HOSPITAL ENCOUNTER (OUTPATIENT)
Dept: CARDIOLOGY | Facility: HOSPITAL | Age: 73
Discharge: HOME | End: 2023-12-20
Payer: MEDICARE

## 2023-12-20 DIAGNOSIS — E87.6 HYPOKALEMIA: ICD-10-CM

## 2023-12-20 DIAGNOSIS — R60.0 EDEMA OF BOTH LOWER EXTREMITIES: ICD-10-CM

## 2023-12-20 DIAGNOSIS — R63.5 WEIGHT GAIN: ICD-10-CM

## 2023-12-20 LAB
ANION GAP SERPL CALC-SCNC: 8 MMOL/L (ref 10–20)
BUN SERPL-MCNC: 15 MG/DL (ref 6–23)
CALCIUM SERPL-MCNC: 10.5 MG/DL (ref 8.6–10.3)
CHLORIDE SERPL-SCNC: 105 MMOL/L (ref 98–107)
CO2 SERPL-SCNC: 33 MMOL/L (ref 21–32)
CREAT SERPL-MCNC: 0.83 MG/DL (ref 0.5–1.05)
GFR SERPL CREATININE-BSD FRML MDRD: 75 ML/MIN/1.73M*2
GLUCOSE SERPL-MCNC: 78 MG/DL (ref 74–99)
POTASSIUM SERPL-SCNC: 4.1 MMOL/L (ref 3.5–5.3)
SODIUM SERPL-SCNC: 142 MMOL/L (ref 136–145)

## 2023-12-20 PROCEDURE — 80048 BASIC METABOLIC PNL TOTAL CA: CPT

## 2023-12-20 PROCEDURE — 93306 TTE W/DOPPLER COMPLETE: CPT | Performed by: STUDENT IN AN ORGANIZED HEALTH CARE EDUCATION/TRAINING PROGRAM

## 2023-12-20 PROCEDURE — 93306 TTE W/DOPPLER COMPLETE: CPT

## 2023-12-20 PROCEDURE — 36415 COLL VENOUS BLD VENIPUNCTURE: CPT

## 2023-12-21 LAB
EJECTION FRACTION APICAL 4 CHAMBER: 66.1
EJECTION FRACTION: 59
LEFT ATRIUM VOLUME AREA LENGTH INDEX BSA: 32
LEFT VENTRICLE INTERNAL DIMENSION DIASTOLE: 4.23 (ref 3.5–6)
LEFT VENTRICULAR OUTFLOW TRACT DIAMETER: 1.77
MITRAL VALVE E/A RATIO: 1.65
MITRAL VALVE E/E' RATIO: 12.64
RIGHT VENTRICLE FREE WALL PEAK S': 13.61
RIGHT VENTRICLE PEAK SYSTOLIC PRESSURE: 28.1
TRICUSPID ANNULAR PLANE SYSTOLIC EXCURSION: 2.2

## 2023-12-22 DIAGNOSIS — I50.9 ACUTE HEART FAILURE, UNSPECIFIED HEART FAILURE TYPE (MULTI): Primary | ICD-10-CM

## 2023-12-22 RX ORDER — FUROSEMIDE 20 MG/1
20 TABLET ORAL DAILY
Qty: 30 TABLET | Refills: 0 | Status: SHIPPED | OUTPATIENT
Start: 2023-12-22 | End: 2024-01-24 | Stop reason: SDUPTHER

## 2023-12-26 ENCOUNTER — LAB (OUTPATIENT)
Dept: LAB | Facility: LAB | Age: 73
End: 2023-12-26
Payer: MEDICARE

## 2023-12-26 DIAGNOSIS — E83.52 HYPERCALCEMIA: ICD-10-CM

## 2023-12-26 DIAGNOSIS — E87.6 HYPOKALEMIA: ICD-10-CM

## 2023-12-26 LAB
ANION GAP SERPL CALC-SCNC: 7 MMOL/L (ref 10–20)
BUN SERPL-MCNC: 14 MG/DL (ref 6–23)
CALCIUM SERPL-MCNC: 10.1 MG/DL (ref 8.6–10.3)
CHLORIDE SERPL-SCNC: 101 MMOL/L (ref 98–107)
CO2 SERPL-SCNC: 35 MMOL/L (ref 21–32)
CREAT SERPL-MCNC: 1.11 MG/DL (ref 0.5–1.05)
GFR SERPL CREATININE-BSD FRML MDRD: 53 ML/MIN/1.73M*2
GLUCOSE SERPL-MCNC: 95 MG/DL (ref 74–99)
POTASSIUM SERPL-SCNC: 3.8 MMOL/L (ref 3.5–5.3)
SODIUM SERPL-SCNC: 139 MMOL/L (ref 136–145)

## 2023-12-26 PROCEDURE — 36415 COLL VENOUS BLD VENIPUNCTURE: CPT

## 2023-12-26 PROCEDURE — 80048 BASIC METABOLIC PNL TOTAL CA: CPT

## 2023-12-27 ENCOUNTER — OFFICE VISIT (OUTPATIENT)
Dept: CARDIOLOGY | Facility: CLINIC | Age: 73
End: 2023-12-27
Payer: MEDICARE

## 2023-12-27 VITALS
SYSTOLIC BLOOD PRESSURE: 160 MMHG | HEART RATE: 70 BPM | DIASTOLIC BLOOD PRESSURE: 84 MMHG | BODY MASS INDEX: 20.76 KG/M2 | HEIGHT: 64 IN | WEIGHT: 121.6 LBS | OXYGEN SATURATION: 97 %

## 2023-12-27 DIAGNOSIS — I50.9 ACUTE HEART FAILURE, UNSPECIFIED HEART FAILURE TYPE (MULTI): ICD-10-CM

## 2023-12-27 DIAGNOSIS — I87.2 VENOUS INSUFFICIENCY: ICD-10-CM

## 2023-12-27 DIAGNOSIS — I10 HYPERTENSION, UNSPECIFIED TYPE: ICD-10-CM

## 2023-12-27 DIAGNOSIS — I50.33 ACUTE ON CHRONIC DIASTOLIC HEART FAILURE (MULTI): Primary | ICD-10-CM

## 2023-12-27 PROCEDURE — 3077F SYST BP >= 140 MM HG: CPT | Performed by: INTERNAL MEDICINE

## 2023-12-27 PROCEDURE — 99214 OFFICE O/P EST MOD 30 MIN: CPT | Performed by: INTERNAL MEDICINE

## 2023-12-27 PROCEDURE — 1160F RVW MEDS BY RX/DR IN RCRD: CPT | Performed by: INTERNAL MEDICINE

## 2023-12-27 PROCEDURE — 99204 OFFICE O/P NEW MOD 45 MIN: CPT | Performed by: INTERNAL MEDICINE

## 2023-12-27 PROCEDURE — 93005 ELECTROCARDIOGRAM TRACING: CPT | Performed by: INTERNAL MEDICINE

## 2023-12-27 PROCEDURE — 3079F DIAST BP 80-89 MM HG: CPT | Performed by: INTERNAL MEDICINE

## 2023-12-27 PROCEDURE — 1159F MED LIST DOCD IN RCRD: CPT | Performed by: INTERNAL MEDICINE

## 2023-12-27 PROCEDURE — 93010 ELECTROCARDIOGRAM REPORT: CPT | Performed by: INTERNAL MEDICINE

## 2023-12-27 PROCEDURE — 1126F AMNT PAIN NOTED NONE PRSNT: CPT | Performed by: INTERNAL MEDICINE

## 2023-12-27 PROCEDURE — 1036F TOBACCO NON-USER: CPT | Performed by: INTERNAL MEDICINE

## 2023-12-27 RX ORDER — CARVEDILOL 6.25 MG/1
6.25 TABLET ORAL
Qty: 60 TABLET | Refills: 11 | Status: SHIPPED | OUTPATIENT
Start: 2023-12-27 | End: 2024-12-26

## 2023-12-27 ASSESSMENT — ENCOUNTER SYMPTOMS
CARDIOVASCULAR NEGATIVE: 1
ALLERGIC/IMMUNOLOGIC NEGATIVE: 1
RESPIRATORY NEGATIVE: 1
EYES NEGATIVE: 1
CONSTITUTIONAL NEGATIVE: 1
HEMATOLOGIC/LYMPHATIC NEGATIVE: 1
GASTROINTESTINAL NEGATIVE: 1
MUSCULOSKELETAL NEGATIVE: 1
PSYCHIATRIC NEGATIVE: 1
NEUROLOGICAL NEGATIVE: 1
ENDOCRINE NEGATIVE: 1

## 2023-12-27 ASSESSMENT — PAIN SCALES - GENERAL: PAINLEVEL: 0-NO PAIN

## 2023-12-27 ASSESSMENT — PATIENT HEALTH QUESTIONNAIRE - PHQ9
SUM OF ALL RESPONSES TO PHQ9 QUESTIONS 1 AND 2: 0
2. FEELING DOWN, DEPRESSED OR HOPELESS: NOT AT ALL
1. LITTLE INTEREST OR PLEASURE IN DOING THINGS: NOT AT ALL

## 2023-12-27 NOTE — PROGRESS NOTES
Preventive Cardiology Clinic Note    Reason for Visit: Possible heart failure  Referring Clinician: Dr. Rizzo    History of Present Illness: Lis Zeng is a 73 y.o. female with mild cognitive impairment and hypertension who was referred by her primary care physician for concern over heart failure.  Recently she noticed increased swelling of both of her ankles and feet and so she saw her primary care physician who stopped amlodipine and noted that her blood pressure was elevated so increase the dose of lisinopril from 20 mg to 30 mg.  It was also noted that her potassium was very low at 2.8 so she was started on supplemental potassium but the cause was not clear.  Around the same time she was having increasing headaches and saw a new neurologist who had her on propranolol.  She was sent for an echocardiogram which showed normal LV systolic function, pseudonormal filling dysfunction and mild aortic regurgitation heart failure so she started 20 mg of Lasix daily with some mild improvement in her lower extremity swelling.  She does not have any exertional chest pain, shortness of breath, palpitations, syncope orthopnea, paroxysmal nocturnal dyspnea.  She is a non-smoker.  There is no family history of cardiac disease.  Her blood pressure is elevated in the office today at 160/84 mmHg.    Past Medical History:  She has a past medical history of Encounter for general adult medical examination without abnormal findings (01/20/2021), Migraine without aura (06/13/2023), Other conditions influencing health status, Pelvic and perineal pain (07/29/2021), Personal history of other diseases of the female genital tract, Personal history of other diseases of the musculoskeletal system and connective tissue, Personal history of other diseases of the musculoskeletal system and connective tissue, Personal history of other diseases of the nervous system and sense organs, and Personal history of other endocrine, nutritional and  metabolic disease.    Past Surgical History:  She has a past surgical history that includes Other surgical history (10/10/2012); Cholecystectomy (10/10/2012); Breast biopsy (10/31/2017); and Total abdominal hysterectomy w/ bilateral salpingoophorectomy (10/31/2017).    Social History:  She reports that she has never smoked. She has been exposed to tobacco smoke. She has never used smokeless tobacco. She reports that she does not currently use alcohol. She reports that she does not use drugs.    Family History:  Family History   Problem Relation Name Age of Onset    Colon cancer Mother      Diabetes Mother      Alzheimer's disease Father      Diabetes Sister      Pneumonia Other      Alzheimer's disease Other      Colon cancer Other      Diabetes Other      Paget's disease of bone Other          osteocarcoma of jaw    Pemphigus vulgaris Other         Allergies:  Penicillins and Sulfa (sulfonamide antibiotics)    Outpatient Medications:  Current Outpatient Medications   Medication Instructions    colchicine 0.6 mg, oral, Daily, AS DIRECTED    donepezil (ARICEPT) 10 mg, oral, Daily    ergocalciferol (Vitamin D-2) 1.25 MG (77965 UT) capsule every 14 (fourteen) days.    estrogens, conjugated, (Premarin) 0.625 mg tablet Take one tablet by mouth daily for one month, then take one tablet by mouth every other day for 1 month, then stop.    furosemide (LASIX) 20 mg, oral, Daily    levothyroxine (SYNTHROID, LEVOXYL) 125 mcg, oral, Daily    lisinopril 20 mg, oral, Daily, as directed    multivitamin tablet 1 tablet, oral, Daily    potassium chloride CR (Klor-Con M20) 20 mEq ER tablet 20 mEq, oral, Daily, Do not crush or chew.       Review of Systems:  Review of Systems   Constitutional: Negative.   HENT: Negative.     Eyes: Negative.    Cardiovascular: Negative.    Respiratory: Negative.     Endocrine: Negative.    Hematologic/Lymphatic: Negative.    Skin: Negative.    Musculoskeletal: Negative.    Gastrointestinal: Negative.   "  Genitourinary: Negative.    Neurological: Negative.    Psychiatric/Behavioral: Negative.     Allergic/Immunologic: Negative.        Last Recorded Vitals:  Vitals:    12/27/23 1423   BP: 160/84   BP Location: Left arm   Patient Position: Sitting   BP Cuff Size: Adult   Pulse: 70   SpO2: 97%   Weight: 55.2 kg (121 lb 9.6 oz)   Height: 1.626 m (5' 4\")       Physical Examination:  General: Well appearing, well-nourished, in no acute distress.  HEENT: Normocephalic atraumatic, pupils equal and reactive to light, extraocular muscles intact, no conjunctival injection, oropharynx clear without exudates.  Neck: Normal carotid arterial pulses, no arterial bruits, no thyromegaly.  Cardiac: Regular rhythm and normal heart rate.  S1, S2 present and normal.  No murmurs, rubs, or gallops.  PMI is nondisplaced. Jugular venous pulsations are normal.  Pulmonary: Normal breath sounds, no increased work of breathing, no wheezes or crackles.  GI: Normal bowel sounds, abdominal aorta not enlarged, no hepatosplenomegaly, no abdominal bruits.  Lower extremities: No cyanosis, clubbing.  There is 2+ pitting edema in the feet and ankles going FDC up the shins.  There are signs of venous insufficiency with a varicose veins in both legs.  No xanthelasma present. Normal distal pulses.  Skin: Skin intact. No significant rashes or lesions present.  Neuro: Alert and oriented x 3, normal attention and cognition, no focal motor or sensory neurologic deficits.  Psych: Normal affect and mood.  Musculoskeletal: Normal gait normal muscle tone.    Laboratory Studies:  Lab Results   Component Value Date    GLUCOSE 95 12/26/2023    CALCIUM 10.1 12/26/2023     12/26/2023    K 3.8 12/26/2023    CO2 35 (H) 12/26/2023     12/26/2023    BUN 14 12/26/2023    CREATININE 1.11 (H) 12/26/2023     Lab Results   Component Value Date    ALT 8 06/20/2023    AST 12 06/20/2023    ALKPHOS 100 06/20/2023    BILITOT 0.3 06/20/2023         Lab Results "   Component Value Date    CHOL 181 2023    CHOL 202 (H) 2019     Lab Results   Component Value Date    HDL 78.1 2023    HDL 62.2 2019     Lab Results   Component Value Date    LDLCALC 93 2023     Lab Results   Component Value Date    TRIG 52 2023    TRIG 99 2019       Cardiology Tests:  EC2023  ECG in the office today shows normal sinus rhythm, ventricular rate 71 bpm, right bundle branch block, no significant change from prior in .    Echo:  Transthoracic Echo (TTE) Complete 2023   1. Left ventricular systolic function is normal with a 60-65% estimated ejection fraction.   2. Spectral Doppler shows a pseudonormal pattern of left ventricular diastolic filling.   3. RVSP within normal limits.   4. Aortic valve stenosis is not present.   5. Mild aortic valve regurgitation.    CAC Scan 2016: Total coronary artery calcium score of 4     Assessment and Plan:  Problem List Items Addressed This Visit          Cardiac and Vasculature    Hypertension    Relevant Orders    ECG 12 lead (Clinic Performed)     Other Visit Diagnoses       Acute on chronic diastolic heart failure (CMS/[facility])    -  Primary          Lis Zeng is a 73 y.o. female with mild cognitive impairment and hypertension who was referred by her primary care physician for concern over heart failure.  Her echocardiogram shows normal systolic function and some evidence of diastolic dysfunction like related to longstanding hypertension.  However I am not sure if her lower extremity swelling is entirely related to acute on chronic diastolic heart failure as it could be also related to previous amlodipine as well as chronic venous insufficiency due to significant varicose veins in both feet and legs.  I recommend more aggressive blood pressure control so I have stopped her propranolol and will instead start carvedilol 6.25 mg twice daily with plan to increase the dose as tolerated to improve blood  pressure to a goal less than 130/80 mmHg.  Given the concern over diastolic heart failure I will also start empagliflozin 10 mg once daily which can also help with blood pressure.  I recommend compression stockings her legs to improve the swelling and we could consider referral to vascular for venous evaluation if symptoms persist.  I have ordered repeat lab work in 1 week to include a basic metabolic panel as well as a BNP.  I do not want to increase the lisinopril or Lasix at this point due to her creatinine increase which may be related to the ACE inhibitor increased dose or diuretic or both.  I will contact her with the results of her lab work and further recommendations once completed and I will see her again in 3 months here in the office for routine follow-up.  Please do not hesitate to call or contact me with questions or concerns.    Kenrick De Paz MD, FAHA, FACC  Director,  Center for Cardiovascular Prevention  Director,  [facility] Program  Associate Professor of Medicine  [employer]

## 2023-12-29 LAB
ATRIAL RATE: 71 BPM
P AXIS: 74 DEGREES
P OFFSET: 214 MS
P ONSET: 158 MS
PR INTERVAL: 146 MS
Q ONSET: 231 MS
QRS COUNT: 12 BEATS
QRS DURATION: 120 MS
QT INTERVAL: 384 MS
QTC CALCULATION(BAZETT): 417 MS
QTC FREDERICIA: 406 MS
R AXIS: 59 DEGREES
T AXIS: 39 DEGREES
T OFFSET: 423 MS
VENTRICULAR RATE: 71 BPM

## 2023-12-31 ENCOUNTER — PATIENT MESSAGE (OUTPATIENT)
Dept: CARDIOLOGY | Facility: CLINIC | Age: 73
End: 2023-12-31
Payer: MEDICARE

## 2023-12-31 DIAGNOSIS — E87.6 HYPOKALEMIA: ICD-10-CM

## 2023-12-31 DIAGNOSIS — E78.5 BORDERLINE HYPERLIPIDEMIA: Primary | ICD-10-CM

## 2024-01-03 ENCOUNTER — LAB (OUTPATIENT)
Dept: LAB | Facility: LAB | Age: 74
End: 2024-01-03
Payer: MEDICARE

## 2024-01-03 DIAGNOSIS — I10 HYPERTENSION, UNSPECIFIED TYPE: ICD-10-CM

## 2024-01-03 DIAGNOSIS — I50.33 ACUTE ON CHRONIC DIASTOLIC HEART FAILURE (MULTI): ICD-10-CM

## 2024-01-03 LAB
ANION GAP SERPL CALC-SCNC: 10 MMOL/L (ref 10–20)
BNP SERPL-MCNC: 97 PG/ML (ref 0–99)
BUN SERPL-MCNC: 18 MG/DL (ref 6–23)
CALCIUM SERPL-MCNC: 10.5 MG/DL (ref 8.6–10.3)
CHLORIDE SERPL-SCNC: 101 MMOL/L (ref 98–107)
CO2 SERPL-SCNC: 32 MMOL/L (ref 21–32)
CREAT SERPL-MCNC: 1.01 MG/DL (ref 0.5–1.05)
GFR SERPL CREATININE-BSD FRML MDRD: 60 ML/MIN/1.73M*2
GLUCOSE SERPL-MCNC: 94 MG/DL (ref 74–99)
POTASSIUM SERPL-SCNC: 4 MMOL/L (ref 3.5–5.3)
SODIUM SERPL-SCNC: 139 MMOL/L (ref 136–145)

## 2024-01-03 PROCEDURE — 80048 BASIC METABOLIC PNL TOTAL CA: CPT

## 2024-01-03 PROCEDURE — 83880 ASSAY OF NATRIURETIC PEPTIDE: CPT

## 2024-01-03 PROCEDURE — 36415 COLL VENOUS BLD VENIPUNCTURE: CPT

## 2024-01-11 ENCOUNTER — LAB (OUTPATIENT)
Dept: LAB | Facility: LAB | Age: 74
End: 2024-01-11
Payer: MEDICARE

## 2024-01-11 DIAGNOSIS — E78.5 BORDERLINE HYPERLIPIDEMIA: ICD-10-CM

## 2024-01-11 LAB
ANION GAP SERPL CALC-SCNC: 9 MMOL/L (ref 10–20)
BUN SERPL-MCNC: 17 MG/DL (ref 6–23)
CALCIUM SERPL-MCNC: 10.6 MG/DL (ref 8.6–10.3)
CHLORIDE SERPL-SCNC: 100 MMOL/L (ref 98–107)
CO2 SERPL-SCNC: 32 MMOL/L (ref 21–32)
CREAT SERPL-MCNC: 1.04 MG/DL (ref 0.5–1.05)
EGFRCR SERPLBLD CKD-EPI 2021: 58 ML/MIN/1.73M*2
GLUCOSE SERPL-MCNC: 96 MG/DL (ref 74–99)
POTASSIUM SERPL-SCNC: 3.8 MMOL/L (ref 3.5–5.3)
SODIUM SERPL-SCNC: 137 MMOL/L (ref 136–145)

## 2024-01-11 PROCEDURE — 36415 COLL VENOUS BLD VENIPUNCTURE: CPT

## 2024-01-11 PROCEDURE — 80048 BASIC METABOLIC PNL TOTAL CA: CPT

## 2024-01-12 RX ORDER — POTASSIUM CHLORIDE 20 MEQ/1
20 TABLET, EXTENDED RELEASE ORAL DAILY
Qty: 30 TABLET | Refills: 11 | Status: SHIPPED | OUTPATIENT
Start: 2024-01-12 | End: 2025-01-11

## 2024-01-24 ENCOUNTER — PATIENT MESSAGE (OUTPATIENT)
Dept: CARDIOLOGY | Facility: CLINIC | Age: 74
End: 2024-01-24
Payer: MEDICARE

## 2024-01-24 DIAGNOSIS — M79.89 LOCALIZED SWELLING OF LOWER EXTREMITY: Primary | ICD-10-CM

## 2024-01-24 DIAGNOSIS — I50.9 ACUTE HEART FAILURE, UNSPECIFIED HEART FAILURE TYPE (MULTI): ICD-10-CM

## 2024-01-24 RX ORDER — FUROSEMIDE 20 MG/1
20 TABLET ORAL DAILY
Qty: 30 TABLET | Refills: 3 | Status: SHIPPED | OUTPATIENT
Start: 2024-01-24

## 2024-01-26 DIAGNOSIS — M79.89 LOCALIZED SWELLING OF LOWER EXTREMITY: Primary | ICD-10-CM

## 2024-01-26 RX ORDER — FUROSEMIDE 20 MG/1
20 TABLET ORAL DAILY
Qty: 30 TABLET | Refills: 11 | OUTPATIENT
Start: 2024-01-26 | End: 2025-01-25

## 2024-02-08 ENCOUNTER — PATIENT MESSAGE (OUTPATIENT)
Dept: CARDIOLOGY | Facility: CLINIC | Age: 74
End: 2024-02-08
Payer: MEDICARE

## 2024-02-08 DIAGNOSIS — I50.33 ACUTE ON CHRONIC DIASTOLIC HEART FAILURE (MULTI): Primary | ICD-10-CM

## 2024-02-12 RX ORDER — DAPAGLIFLOZIN 10 MG/1
10 TABLET, FILM COATED ORAL DAILY
Qty: 90 TABLET | Refills: 3 | Status: SHIPPED | OUTPATIENT
Start: 2024-02-12 | End: 2024-03-22 | Stop reason: CLARIF

## 2024-02-16 RX ORDER — LISINOPRIL 20 MG/1
30 TABLET ORAL DAILY
Qty: 135 TABLET | Refills: 3 | Status: SHIPPED | OUTPATIENT
Start: 2024-02-16 | End: 2025-02-15

## 2024-02-23 DIAGNOSIS — E55.9 VITAMIN D DEFICIENCY: Primary | ICD-10-CM

## 2024-02-23 RX ORDER — ERGOCALCIFEROL 1.25 MG/1
CAPSULE ORAL
Qty: 6 CAPSULE | Refills: 1 | Status: SHIPPED | OUTPATIENT
Start: 2024-02-23

## 2024-03-18 ENCOUNTER — APPOINTMENT (OUTPATIENT)
Dept: PRIMARY CARE | Facility: CLINIC | Age: 74
End: 2024-03-18
Payer: MEDICARE

## 2024-03-25 PROBLEM — R41.3 AMNESIA: Status: ACTIVE | Noted: 2023-06-13

## 2024-03-25 PROBLEM — I50.33 ACUTE ON CHRONIC DIASTOLIC HEART FAILURE (MULTI): Status: ACTIVE | Noted: 2023-12-27

## 2024-03-25 PROBLEM — I47.10 SUPRAVENTRICULAR TACHYCARDIA (CMS-HCC): Status: ACTIVE | Noted: 2024-03-25

## 2024-03-25 PROBLEM — I99.8 VASCULAR INSUFFICIENCY: Status: ACTIVE | Noted: 2023-12-27

## 2024-03-27 ENCOUNTER — APPOINTMENT (OUTPATIENT)
Dept: CARDIOLOGY | Facility: CLINIC | Age: 74
End: 2024-03-27
Payer: MEDICARE

## 2024-04-23 ENCOUNTER — OFFICE VISIT (OUTPATIENT)
Dept: PRIMARY CARE | Facility: CLINIC | Age: 74
End: 2024-04-23
Payer: MEDICARE

## 2024-04-23 ENCOUNTER — LAB (OUTPATIENT)
Dept: LAB | Facility: LAB | Age: 74
End: 2024-04-23
Payer: MEDICARE

## 2024-04-23 VITALS
WEIGHT: 132.8 LBS | SYSTOLIC BLOOD PRESSURE: 114 MMHG | BODY MASS INDEX: 22.8 KG/M2 | DIASTOLIC BLOOD PRESSURE: 60 MMHG | HEART RATE: 62 BPM | OXYGEN SATURATION: 97 % | TEMPERATURE: 97.8 F

## 2024-04-23 DIAGNOSIS — E03.9 HYPOTHYROIDISM (ACQUIRED): ICD-10-CM

## 2024-04-23 DIAGNOSIS — R60.0 EDEMA OF BOTH LOWER EXTREMITIES: ICD-10-CM

## 2024-04-23 DIAGNOSIS — D69.6 THROMBOCYTOPENIA, UNSPECIFIED (CMS-HCC): ICD-10-CM

## 2024-04-23 DIAGNOSIS — R10.2 SUPRAPUBIC PAIN: ICD-10-CM

## 2024-04-23 DIAGNOSIS — Z12.31 ENCOUNTER FOR SCREENING MAMMOGRAM FOR BREAST CANCER: ICD-10-CM

## 2024-04-23 DIAGNOSIS — R31.9 HEMATURIA, UNSPECIFIED TYPE: ICD-10-CM

## 2024-04-23 DIAGNOSIS — I50.33 ACUTE ON CHRONIC DIASTOLIC HEART FAILURE (MULTI): ICD-10-CM

## 2024-04-23 DIAGNOSIS — Z00.00 MEDICARE ANNUAL WELLNESS VISIT, SUBSEQUENT: Primary | ICD-10-CM

## 2024-04-23 DIAGNOSIS — Z23 ENCOUNTER TO VACCINATE PATIENT: ICD-10-CM

## 2024-04-23 DIAGNOSIS — I47.10 SUPRAVENTRICULAR TACHYCARDIA (CMS-HCC): ICD-10-CM

## 2024-04-23 DIAGNOSIS — F03.918 DEMENTIA WITH OTHER BEHAVIORAL DISTURBANCE, UNSPECIFIED DEMENTIA SEVERITY, UNSPECIFIED DEMENTIA TYPE (MULTI): ICD-10-CM

## 2024-04-23 LAB
ALBUMIN SERPL BCP-MCNC: 4.3 G/DL (ref 3.4–5)
ALP SERPL-CCNC: 115 U/L (ref 33–136)
ALT SERPL W P-5'-P-CCNC: 13 U/L (ref 7–45)
ANION GAP SERPL CALC-SCNC: 8 MMOL/L (ref 10–20)
AST SERPL W P-5'-P-CCNC: 15 U/L (ref 9–39)
BILIRUB SERPL-MCNC: 0.5 MG/DL (ref 0–1.2)
BUN SERPL-MCNC: 21 MG/DL (ref 6–23)
CALCIUM SERPL-MCNC: 10.5 MG/DL (ref 8.6–10.3)
CHLORIDE SERPL-SCNC: 101 MMOL/L (ref 98–107)
CO2 SERPL-SCNC: 32 MMOL/L (ref 21–32)
CREAT SERPL-MCNC: 1.15 MG/DL (ref 0.5–1.05)
EGFRCR SERPLBLD CKD-EPI 2021: 51 ML/MIN/1.73M*2
ERYTHROCYTE [DISTWIDTH] IN BLOOD BY AUTOMATED COUNT: 13.8 % (ref 11.5–14.5)
GLUCOSE SERPL-MCNC: 83 MG/DL (ref 74–99)
HCT VFR BLD AUTO: 42.3 % (ref 36–46)
HGB BLD-MCNC: 13.8 G/DL (ref 12–16)
MCH RBC QN AUTO: 31.8 PG (ref 26–34)
MCHC RBC AUTO-ENTMCNC: 32.6 G/DL (ref 32–36)
MCV RBC AUTO: 98 FL (ref 80–100)
NRBC BLD-RTO: 0 /100 WBCS (ref 0–0)
PLATELET # BLD AUTO: 189 X10*3/UL (ref 150–450)
POC APPEARANCE, URINE: CLEAR
POC BILIRUBIN, URINE: NEGATIVE
POC BLOOD, URINE: ABNORMAL
POC COLOR, URINE: ABNORMAL
POC GLUCOSE, URINE: ABNORMAL MG/DL
POC KETONES, URINE: NEGATIVE MG/DL
POC LEUKOCYTES, URINE: NEGATIVE
POC NITRITE,URINE: NEGATIVE
POC PH, URINE: 6 PH
POC PROTEIN, URINE: NEGATIVE MG/DL
POC SPECIFIC GRAVITY, URINE: 1.01
POC UROBILINOGEN, URINE: 0.2 EU/DL
POTASSIUM SERPL-SCNC: 4.4 MMOL/L (ref 3.5–5.3)
PROT SERPL-MCNC: 7 G/DL (ref 6.4–8.2)
RBC # BLD AUTO: 4.34 X10*6/UL (ref 4–5.2)
SODIUM SERPL-SCNC: 137 MMOL/L (ref 136–145)
TSH SERPL-ACNC: 0.53 MIU/L (ref 0.44–3.98)
WBC # BLD AUTO: 7.2 X10*3/UL (ref 4.4–11.3)

## 2024-04-23 PROCEDURE — 85027 COMPLETE CBC AUTOMATED: CPT

## 2024-04-23 PROCEDURE — G0009 ADMIN PNEUMOCOCCAL VACCINE: HCPCS | Performed by: NURSE PRACTITIONER

## 2024-04-23 PROCEDURE — 1159F MED LIST DOCD IN RCRD: CPT | Performed by: NURSE PRACTITIONER

## 2024-04-23 PROCEDURE — 3074F SYST BP LT 130 MM HG: CPT | Performed by: NURSE PRACTITIONER

## 2024-04-23 PROCEDURE — 87086 URINE CULTURE/COLONY COUNT: CPT

## 2024-04-23 PROCEDURE — 90677 PCV20 VACCINE IM: CPT | Performed by: NURSE PRACTITIONER

## 2024-04-23 PROCEDURE — G0439 PPPS, SUBSEQ VISIT: HCPCS | Performed by: NURSE PRACTITIONER

## 2024-04-23 PROCEDURE — 1036F TOBACCO NON-USER: CPT | Performed by: NURSE PRACTITIONER

## 2024-04-23 PROCEDURE — 1170F FXNL STATUS ASSESSED: CPT | Performed by: NURSE PRACTITIONER

## 2024-04-23 PROCEDURE — 3078F DIAST BP <80 MM HG: CPT | Performed by: NURSE PRACTITIONER

## 2024-04-23 PROCEDURE — 1157F ADVNC CARE PLAN IN RCRD: CPT | Performed by: NURSE PRACTITIONER

## 2024-04-23 PROCEDURE — 80053 COMPREHEN METABOLIC PANEL: CPT

## 2024-04-23 PROCEDURE — 99214 OFFICE O/P EST MOD 30 MIN: CPT | Performed by: NURSE PRACTITIONER

## 2024-04-23 PROCEDURE — 84443 ASSAY THYROID STIM HORMONE: CPT

## 2024-04-23 PROCEDURE — 81003 URINALYSIS AUTO W/O SCOPE: CPT | Performed by: NURSE PRACTITIONER

## 2024-04-23 PROCEDURE — 36415 COLL VENOUS BLD VENIPUNCTURE: CPT

## 2024-04-23 PROCEDURE — 1160F RVW MEDS BY RX/DR IN RCRD: CPT | Performed by: NURSE PRACTITIONER

## 2024-04-23 ASSESSMENT — ENCOUNTER SYMPTOMS
WEAKNESS: 0
CHEST TIGHTNESS: 0
DIARRHEA: 0
CHILLS: 0
NAUSEA: 0
SHORTNESS OF BREATH: 0
HEADACHES: 1
ABDOMINAL PAIN: 0
FEVER: 0
NUMBNESS: 0
PALPITATIONS: 0
COUGH: 0
DIZZINESS: 0
VOMITING: 0
FATIGUE: 0

## 2024-04-23 ASSESSMENT — ACTIVITIES OF DAILY LIVING (ADL)
GROCERY_SHOPPING: TOTAL CARE
DOING_HOUSEWORK: TOTAL CARE
TAKING_MEDICATION: TOTAL CARE
BATHING: INDEPENDENT
MANAGING_FINANCES: TOTAL CARE
DRESSING: INDEPENDENT

## 2024-04-23 NOTE — PROGRESS NOTES
Subjective   Reason for Visit: Lis Zeng is an 74 y.o. female here for a Medicare Wellness visit.     Past Medical, Surgical, and Family History reviewed and updated in chart.    Reviewed all medications by prescribing practitioner or clinical pharmacist (such as prescriptions, OTCs, herbal therapies and supplements) and documented in the medical record.    Received a copy of her HCPOA - scanned in chart.    HPI  She presents to the office today for medication refills and follow up.  She is tolerating medications well at current dose- no side effects. No chest pain, shortness of breath, palpitations or edema. No numbness, tingling, weakness or vision changes.  Still with headaches at times- can be severe at time and mild other times.  No dizziness.  Home blood pressure readings: Vary- generally well controlled but has had some lower readings.    Diet: Appetite has changed  Breakfast: Bagel with butter and jelly (sometimes 2 of them)  Lunch: Roast beef slider from [employer] (sometimes 2)  Normal dinner.  Exercise:No scheduled exercise.  Weight: Trending up    Has pain in the right shoulder if lays on it. It can also affect the left shoulder if she lays on it.  (+) mild back pain.   All of the aches are worse when she first stands up and then improve as time goes on.    Patient Care Team:  AGUSTIN Varela-CNP as PCP - General (Family Medicine)  Paul Everett MD as PCP - [facility] ACO Attributed Provider  Kemar Byrne MD as On Call Attending Physician (Neurology)     Review of Systems   Constitutional:  Negative for chills, fatigue and fever.   Eyes:  Negative for visual disturbance.   Respiratory:  Negative for cough, chest tightness and shortness of breath.    Cardiovascular:  Negative for chest pain, palpitations and leg swelling.   Gastrointestinal:  Negative for abdominal pain, diarrhea, nausea and vomiting.   Neurological:  Positive for headaches. Negative for dizziness, weakness and numbness.        Objective   Vitals:  /60 (BP Location: Left arm, Patient Position: Sitting)   Pulse 62   Temp 36.6 °C (97.8 °F) (Temporal)   Wt 60.2 kg (132 lb 12.8 oz)   SpO2 97%   BMI 22.80 kg/m²       Physical Exam  Constitutional:       General: She is not in acute distress.     Appearance: Normal appearance. She is not toxic-appearing.   Eyes:      Extraocular Movements: Extraocular movements intact.      Conjunctiva/sclera: Conjunctivae normal.      Pupils: Pupils are equal, round, and reactive to light.   Neck:      Vascular: No carotid bruit.   Cardiovascular:      Rate and Rhythm: Normal rate and regular rhythm.      Pulses: Normal pulses.      Heart sounds: Normal heart sounds, S1 normal and S2 normal. No murmur heard.  Pulmonary:      Effort: Pulmonary effort is normal. No respiratory distress.      Breath sounds: Normal breath sounds.   Abdominal:      General: Bowel sounds are normal.      Palpations: Abdomen is soft.      Tenderness: There is abdominal tenderness in the suprapubic area.   Musculoskeletal:      Right lower leg: No edema.      Left lower leg: No edema.   Lymphadenopathy:      Cervical: No cervical adenopathy.   Neurological:      Mental Status: She is alert.   Psychiatric:         Attention and Perception: Attention normal.         Mood and Affect: Mood and affect normal.         Behavior: Behavior normal. Behavior is cooperative.         Thought Content: Thought content normal.         Cognition and Memory: Cognition normal.         Judgment: Judgment normal.         Assessment/Plan   Problem List Items Addressed This Visit       Suprapubic pain    Relevant Orders    POCT UA Automated manually resulted (Completed)    Urine Culture    Hypothyroidism (acquired)    Relevant Orders    TSH with reflex to Free T4 if abnormal    Thrombocytopenia, unspecified (CMS-HCC)    Relevant Orders    CBC    Dementia (Multi)    Edema of both lower extremities    Current Assessment & Plan     Much better  on exam today.         Supraventricular tachycardia (CMS-HCC)    Acute on chronic diastolic heart failure (Multi)    Relevant Orders    Comprehensive Metabolic Panel    Medicare annual wellness visit, subsequent - Primary    Current Assessment & Plan     Up-to-date on age-appropriate screenings and vaccines.  Mammogram ordered today.          Other Visit Diagnoses       Encounter to vaccinate patient        Relevant Orders    Pneumococcal conjugate vaccine, 20-valent (PREVNAR 20) (Completed)    Encounter for screening mammogram for breast cancer        Relevant Orders    BI mammo bilateral screening tomosynthesis    Hematuria, unspecified type        Relevant Orders    Urine Culture

## 2024-04-25 DIAGNOSIS — R41.3 MEMORY LOSS: ICD-10-CM

## 2024-04-25 LAB — BACTERIA UR CULT: NORMAL

## 2024-04-25 RX ORDER — DONEPEZIL HYDROCHLORIDE 10 MG/1
10 TABLET, FILM COATED ORAL DAILY
Qty: 90 TABLET | Refills: 1 | Status: SHIPPED | OUTPATIENT
Start: 2024-04-25 | End: 2024-10-22

## 2024-05-01 ENCOUNTER — OFFICE VISIT (OUTPATIENT)
Dept: CARDIOLOGY | Facility: CLINIC | Age: 74
End: 2024-05-01
Payer: MEDICARE

## 2024-05-01 VITALS
HEIGHT: 64 IN | WEIGHT: 133 LBS | OXYGEN SATURATION: 98 % | DIASTOLIC BLOOD PRESSURE: 60 MMHG | BODY MASS INDEX: 22.71 KG/M2 | HEART RATE: 62 BPM | SYSTOLIC BLOOD PRESSURE: 122 MMHG

## 2024-05-01 DIAGNOSIS — I50.32 CHRONIC DIASTOLIC HEART FAILURE (MULTI): Primary | ICD-10-CM

## 2024-05-01 DIAGNOSIS — I10 HYPERTENSION, UNSPECIFIED TYPE: ICD-10-CM

## 2024-05-01 PROCEDURE — 99214 OFFICE O/P EST MOD 30 MIN: CPT | Performed by: INTERNAL MEDICINE

## 2024-05-01 PROCEDURE — 3078F DIAST BP <80 MM HG: CPT | Performed by: INTERNAL MEDICINE

## 2024-05-01 PROCEDURE — 1159F MED LIST DOCD IN RCRD: CPT | Performed by: INTERNAL MEDICINE

## 2024-05-01 PROCEDURE — 1157F ADVNC CARE PLAN IN RCRD: CPT | Performed by: INTERNAL MEDICINE

## 2024-05-01 PROCEDURE — 1126F AMNT PAIN NOTED NONE PRSNT: CPT | Performed by: INTERNAL MEDICINE

## 2024-05-01 PROCEDURE — 1160F RVW MEDS BY RX/DR IN RCRD: CPT | Performed by: INTERNAL MEDICINE

## 2024-05-01 PROCEDURE — 3074F SYST BP LT 130 MM HG: CPT | Performed by: INTERNAL MEDICINE

## 2024-05-01 PROCEDURE — 1036F TOBACCO NON-USER: CPT | Performed by: INTERNAL MEDICINE

## 2024-05-01 ASSESSMENT — ENCOUNTER SYMPTOMS
GASTROINTESTINAL NEGATIVE: 1
EYES NEGATIVE: 1
CONSTITUTIONAL NEGATIVE: 1
RESPIRATORY NEGATIVE: 1
CARDIOVASCULAR NEGATIVE: 1
NEUROLOGICAL NEGATIVE: 1
HEMATOLOGIC/LYMPHATIC NEGATIVE: 1
ALLERGIC/IMMUNOLOGIC NEGATIVE: 1
ENDOCRINE NEGATIVE: 1
PSYCHIATRIC NEGATIVE: 1
MUSCULOSKELETAL NEGATIVE: 1

## 2024-05-01 ASSESSMENT — PAIN SCALES - GENERAL: PAINLEVEL: 0-NO PAIN

## 2024-05-01 NOTE — PROGRESS NOTES
Preventive Cardiology Clinic Note    Reason for Visit: Follow up visit  Referring Clinician: No ref. provider found     History of Present Illness: Lis Zeng is a 74 y.o. female with chronic diastolic heart failure, hypertension, and mild cognitive impairment who is here for a follow up visit.  Since her last visit she has been doing well with improved blood pressure and resolution of her lower extremity swelling.  She is tolerating medications well without any adverse effects and she maintains on multidrug antihypertensive therapy with well-controlled blood pressure in the office today at 122/60 mmHg.  She does not report any chest pain, shortness of breath, palpitations, or syncope.  She does not report any orthopnea, paroxysmal nocturnal dyspnea, or lower extremity swelling.  She is taking Lasix on a daily basis but asks about using this only as needed.    Past Medical History:  She has a past medical history of Encounter for general adult medical examination without abnormal findings (01/20/2021), Migraine without aura (06/13/2023), Other conditions influencing health status, Pelvic and perineal pain (07/29/2021), Personal history of other diseases of the female genital tract, Personal history of other diseases of the musculoskeletal system and connective tissue, Personal history of other diseases of the musculoskeletal system and connective tissue, Personal history of other diseases of the nervous system and sense organs, and Personal history of other endocrine, nutritional and metabolic disease.    Past Surgical History:  She has a past surgical history that includes Other surgical history (10/10/2012); Cholecystectomy (10/10/2012); Breast biopsy (10/31/2017); and Total abdominal hysterectomy w/ bilateral salpingoophorectomy (10/31/2017).    Social History:  She reports that she has never smoked. She has been exposed to tobacco smoke. She has never used smokeless tobacco. She reports that she does not  "currently use alcohol. She reports that she does not use drugs.    Family History:  Family History   Problem Relation Name Age of Onset    Colon cancer Mother      Diabetes Mother      Alzheimer's disease Father      Diabetes Sister      Pneumonia Other      Alzheimer's disease Other      Colon cancer Other      Diabetes Other      Paget's disease of bone Other          osteocarcoma of jaw    Pemphigus vulgaris Other         Allergies:  Penicillins and Sulfa (sulfonamide antibiotics)    Outpatient Medications:  Current Outpatient Medications   Medication Instructions    carvedilol (COREG) 6.25 mg, oral, 2 times daily with meals    donepezil (ARICEPT) 10 mg, oral, Daily    empagliflozin (JARDIANCE) 10 mg, oral, Daily    ergocalciferol (Vitamin D-2) 1.25 MG (10164 UT) capsule every 14 (fourteen) days.    furosemide (LASIX) 20 mg, oral, Daily    levothyroxine (SYNTHROID, LEVOXYL) 125 mcg, oral, Daily    lisinopril 30 mg, oral, Daily, as directed    multivitamin tablet 1 tablet, oral, Daily    potassium chloride CR (Klor-Con M20) 20 mEq ER tablet 20 mEq, oral, Daily, Do not crush or chew.       Review of Systems:  Review of Systems   Constitutional: Negative.   HENT: Negative.     Eyes: Negative.    Cardiovascular: Negative.    Respiratory: Negative.     Endocrine: Negative.    Hematologic/Lymphatic: Negative.    Skin: Negative.    Musculoskeletal: Negative.    Gastrointestinal: Negative.    Genitourinary: Negative.    Neurological: Negative.    Psychiatric/Behavioral: Negative.     Allergic/Immunologic: Negative.        Last Recorded Vitals:  Vitals:    05/01/24 1505   BP: 122/60   BP Location: Right arm   Patient Position: Sitting   BP Cuff Size: Adult   Pulse: 62   SpO2: 98%   Weight: 60.3 kg (133 lb)   Height: 1.626 m (5' 4\")       Physical Examination:  General: Well appearing, well-nourished, in no acute distress.  HEENT: Normocephalic atraumatic, pupils equal and reactive to light, extraocular muscles intact, no " conjunctival injection, oropharynx clear without exudates.  Neck: Normal carotid arterial pulses, no arterial bruits, no thyromegaly.  Cardiac: Regular rhythm and normal heart rate.  S1, S2 present and normal.  No murmurs, rubs, or gallops.  PMI is nondisplaced. Jugular venous pulsations are normal.  Pulmonary: Normal breath sounds, no increased work of breathing, no wheezes or crackles.  GI: Normal bowel sounds, abdominal aorta not enlarged, no hepatosplenomegaly, no abdominal bruits.  Lower extremities: No cyanosis, clubbing, or edema.  No xanthelasma present. Normal distal pulses.  Skin: Skin intact. No significant rashes or lesions present.  Neuro: Alert and oriented x 3, normal attention and cognition, no focal motor or sensory neurologic deficits.  Psych: Normal affect and mood.  Musculoskeletal: Normal gait normal muscle tone.    Laboratory Studies:  Lab Results   Component Value Date    GLUCOSE 83 04/23/2024    CALCIUM 10.5 (H) 04/23/2024     04/23/2024    K 4.4 04/23/2024    CO2 32 04/23/2024     04/23/2024    BUN 21 04/23/2024    CREATININE 1.15 (H) 04/23/2024     Lab Results   Component Value Date    ALT 13 04/23/2024    AST 15 04/23/2024    ALKPHOS 115 04/23/2024    BILITOT 0.5 04/23/2024         Lab Results   Component Value Date    CHOL 181 12/12/2023    CHOL 202 (H) 07/24/2019     Lab Results   Component Value Date    HDL 78.1 12/12/2023    HDL 62.2 07/24/2019     Lab Results   Component Value Date    LDLCALC 93 12/12/2023     Lab Results   Component Value Date    TRIG 52 12/12/2023    TRIG 99 07/24/2019       Cardiology Tests:  Echo:  Transthoracic Echo (TTE) Complete 12/20/2023   1. Left ventricular systolic function is normal with a 60-65% estimated ejection fraction.   2. Spectral Doppler shows a pseudonormal pattern of left ventricular diastolic filling.   3. RVSP within normal limits.   4. Aortic valve stenosis is not present.   5. Mild aortic valve regurgitation.    Assessment and  Plan:  Problem List Items Addressed This Visit          Cardiac and Vasculature    Hypertension     Other Visit Diagnoses       Chronic diastolic heart failure (Multi)    -  Primary          Lis Zeng is a 74 y.o. female with chronic diastolic heart failure, hypertension, and mild cognitive impairment who is here for a follow up visit.  She is currently euvolemic and asymptomatic from a heart failure standpoint today.  I agree that she can likely trial Lasix on an as-needed basis rather than every day given the resolution of her lower extremity swelling.  Her blood pressure is well-controlled on the current regimen and so I did not make any changes today.  I recommend continuing the Jardiance as she is tolerating this well and it is affordable for her.  Going forward I will see her once a year here in the office for routine follow-up and she will otherwise continue to follow with her primary care physician per routine.  Please do not hesitate to call or contact me with questions or concerns.    Kenrick De Paz MD, AMY, FACC  Director,  Center for Cardiovascular Prevention  Director,  [facility] Program  Associate Professor of Medicine  [facility] [employer]

## 2024-05-02 DIAGNOSIS — E83.52 HYPERCALCEMIA: Primary | ICD-10-CM

## 2024-05-06 DIAGNOSIS — E03.9 ACQUIRED HYPOTHYROIDISM: ICD-10-CM

## 2024-05-06 RX ORDER — LEVOTHYROXINE SODIUM 125 UG/1
TABLET ORAL
Qty: 90 TABLET | Refills: 3 | Status: SHIPPED | OUTPATIENT
Start: 2024-05-06

## 2024-05-08 ENCOUNTER — TELEPHONE (OUTPATIENT)
Dept: PRIMARY CARE | Facility: CLINIC | Age: 74
End: 2024-05-08
Payer: MEDICARE

## 2024-05-08 NOTE — TELEPHONE ENCOUNTER
----- Message from LIYAH Varela sent at 5/7/2024  5:06 PM EDT -----  Please relay MyChart results message regarding recent labs.

## 2024-05-08 NOTE — TELEPHONE ENCOUNTER
"Tona results     \"Ravinder Hays,  TSH and CBC looked great. Creatinine remains slightly elevated. Would recommend discussing cutting back Lasix at upcoming cardiology visit.  We will also need to do further testing to evaluate elevated calcium levels.  Please update me after cardiology visit.  AGUSTIN Loredo-JUNIOR Hays,  I reviewed the office note from cardiology and the plan to change the lasix to as needed. I would like to have you recheck your labs (BMP) in about 3 weeks (to recheck calcium level and kidney function once the diuretic is decreased. I will place the orders, please let me know if you have any questions.  Tona Rizzo, AGUSTIN-CNP\"    Per HIPAA ok to leave detailed message, LM on pt's  voicemail with results. She should call back if she has any questions or concerns.   "

## 2024-08-06 DIAGNOSIS — F03.918 DEMENTIA WITH OTHER BEHAVIORAL DISTURBANCE, UNSPECIFIED DEMENTIA SEVERITY, UNSPECIFIED DEMENTIA TYPE (MULTI): Primary | ICD-10-CM

## 2024-08-06 DIAGNOSIS — E03.9 ACQUIRED HYPOTHYROIDISM: ICD-10-CM

## 2024-08-09 ENCOUNTER — LAB (OUTPATIENT)
Dept: LAB | Facility: LAB | Age: 74
End: 2024-08-09
Payer: MEDICARE

## 2024-08-09 ENCOUNTER — OFFICE VISIT (OUTPATIENT)
Dept: PRIMARY CARE | Facility: CLINIC | Age: 74
End: 2024-08-09
Payer: MEDICARE

## 2024-08-09 VITALS
WEIGHT: 135.6 LBS | OXYGEN SATURATION: 97 % | SYSTOLIC BLOOD PRESSURE: 172 MMHG | HEART RATE: 59 BPM | DIASTOLIC BLOOD PRESSURE: 82 MMHG | BODY MASS INDEX: 23.28 KG/M2 | TEMPERATURE: 97.6 F

## 2024-08-09 DIAGNOSIS — E03.9 ACQUIRED HYPOTHYROIDISM: ICD-10-CM

## 2024-08-09 DIAGNOSIS — R80.9 PROTEINURIA, UNSPECIFIED TYPE: ICD-10-CM

## 2024-08-09 DIAGNOSIS — E55.9 VITAMIN D DEFICIENCY: ICD-10-CM

## 2024-08-09 DIAGNOSIS — F03.918 DEMENTIA WITH OTHER BEHAVIORAL DISTURBANCE, UNSPECIFIED DEMENTIA SEVERITY, UNSPECIFIED DEMENTIA TYPE (MULTI): ICD-10-CM

## 2024-08-09 DIAGNOSIS — R35.0 URINE FREQUENCY: ICD-10-CM

## 2024-08-09 DIAGNOSIS — F03.918 DEMENTIA WITH OTHER BEHAVIORAL DISTURBANCE, UNSPECIFIED DEMENTIA SEVERITY, UNSPECIFIED DEMENTIA TYPE (MULTI): Primary | ICD-10-CM

## 2024-08-09 DIAGNOSIS — E83.52 HYPERCALCEMIA: ICD-10-CM

## 2024-08-09 DIAGNOSIS — N18.31 STAGE 3A CHRONIC KIDNEY DISEASE (MULTI): ICD-10-CM

## 2024-08-09 LAB
ANION GAP SERPL CALC-SCNC: 10 MMOL/L (ref 10–20)
BASOPHILS # BLD AUTO: 0.07 X10*3/UL (ref 0–0.1)
BASOPHILS NFR BLD AUTO: 1.1 %
BUN SERPL-MCNC: 13 MG/DL (ref 6–23)
CALCIUM SERPL-MCNC: 10.5 MG/DL (ref 8.6–10.3)
CHLORIDE SERPL-SCNC: 102 MMOL/L (ref 98–107)
CO2 SERPL-SCNC: 31 MMOL/L (ref 21–32)
CREAT SERPL-MCNC: 1.06 MG/DL (ref 0.5–1.05)
EGFRCR SERPLBLD CKD-EPI 2021: 56 ML/MIN/1.73M*2
EOSINOPHIL # BLD AUTO: 0.08 X10*3/UL (ref 0–0.4)
EOSINOPHIL NFR BLD AUTO: 1.2 %
ERYTHROCYTE [DISTWIDTH] IN BLOOD BY AUTOMATED COUNT: 12.7 % (ref 11.5–14.5)
GLUCOSE SERPL-MCNC: 89 MG/DL (ref 74–99)
HCT VFR BLD AUTO: 42 % (ref 36–46)
HGB BLD-MCNC: 13.4 G/DL (ref 12–16)
IMM GRANULOCYTES # BLD AUTO: 0.02 X10*3/UL (ref 0–0.5)
IMM GRANULOCYTES NFR BLD AUTO: 0.3 % (ref 0–0.9)
LYMPHOCYTES # BLD AUTO: 2.32 X10*3/UL (ref 0.8–3)
LYMPHOCYTES NFR BLD AUTO: 35.1 %
MCH RBC QN AUTO: 30.5 PG (ref 26–34)
MCHC RBC AUTO-ENTMCNC: 31.9 G/DL (ref 32–36)
MCV RBC AUTO: 96 FL (ref 80–100)
MONOCYTES # BLD AUTO: 0.6 X10*3/UL (ref 0.05–0.8)
MONOCYTES NFR BLD AUTO: 9.1 %
NEUTROPHILS # BLD AUTO: 3.52 X10*3/UL (ref 1.6–5.5)
NEUTROPHILS NFR BLD AUTO: 53.2 %
NRBC BLD-RTO: 0 /100 WBCS (ref 0–0)
PLATELET # BLD AUTO: 175 X10*3/UL (ref 150–450)
POC APPEARANCE, URINE: ABNORMAL
POC BILIRUBIN, URINE: NEGATIVE
POC BLOOD, URINE: NEGATIVE
POC COLOR, URINE: YELLOW
POC GLUCOSE, URINE: ABNORMAL MG/DL
POC KETONES, URINE: NEGATIVE MG/DL
POC LEUKOCYTES, URINE: NEGATIVE
POC NITRITE,URINE: NEGATIVE
POC PH, URINE: 6.5 PH
POC PROTEIN, URINE: ABNORMAL MG/DL
POC SPECIFIC GRAVITY, URINE: 1.02
POC UROBILINOGEN, URINE: 0.2 EU/DL
POTASSIUM SERPL-SCNC: 3.3 MMOL/L (ref 3.5–5.3)
RBC # BLD AUTO: 4.39 X10*6/UL (ref 4–5.2)
SODIUM SERPL-SCNC: 140 MMOL/L (ref 136–145)
TSH SERPL-ACNC: 1.14 MIU/L (ref 0.44–3.98)
WBC # BLD AUTO: 6.6 X10*3/UL (ref 4.4–11.3)

## 2024-08-09 PROCEDURE — 80048 BASIC METABOLIC PNL TOTAL CA: CPT

## 2024-08-09 PROCEDURE — 36415 COLL VENOUS BLD VENIPUNCTURE: CPT

## 2024-08-09 PROCEDURE — 87086 URINE CULTURE/COLONY COUNT: CPT

## 2024-08-09 PROCEDURE — 1157F ADVNC CARE PLAN IN RCRD: CPT | Performed by: NURSE PRACTITIONER

## 2024-08-09 PROCEDURE — 81003 URINALYSIS AUTO W/O SCOPE: CPT | Performed by: NURSE PRACTITIONER

## 2024-08-09 PROCEDURE — 1160F RVW MEDS BY RX/DR IN RCRD: CPT | Performed by: NURSE PRACTITIONER

## 2024-08-09 PROCEDURE — 3079F DIAST BP 80-89 MM HG: CPT | Performed by: NURSE PRACTITIONER

## 2024-08-09 PROCEDURE — 84443 ASSAY THYROID STIM HORMONE: CPT

## 2024-08-09 PROCEDURE — 3077F SYST BP >= 140 MM HG: CPT | Performed by: NURSE PRACTITIONER

## 2024-08-09 PROCEDURE — 1159F MED LIST DOCD IN RCRD: CPT | Performed by: NURSE PRACTITIONER

## 2024-08-09 PROCEDURE — 99214 OFFICE O/P EST MOD 30 MIN: CPT | Performed by: NURSE PRACTITIONER

## 2024-08-09 PROCEDURE — 85025 COMPLETE CBC W/AUTO DIFF WBC: CPT

## 2024-08-09 PROCEDURE — 83970 ASSAY OF PARATHORMONE: CPT

## 2024-08-09 PROCEDURE — 82043 UR ALBUMIN QUANTITATIVE: CPT

## 2024-08-09 PROCEDURE — 82570 ASSAY OF URINE CREATININE: CPT

## 2024-08-09 PROCEDURE — 1036F TOBACCO NON-USER: CPT | Performed by: NURSE PRACTITIONER

## 2024-08-09 RX ORDER — ERGOCALCIFEROL 1.25 MG/1
CAPSULE ORAL
Qty: 6 CAPSULE | Refills: 1 | Status: SHIPPED | OUTPATIENT
Start: 2024-08-09

## 2024-08-09 ASSESSMENT — ENCOUNTER SYMPTOMS
HEMATURIA: 0
HEADACHES: 0
FEVER: 0
COUGH: 1
DYSURIA: 0
VOMITING: 0
CONSTIPATION: 1
SHORTNESS OF BREATH: 0
ABDOMINAL PAIN: 0
PALPITATIONS: 0
DIFFICULTY URINATING: 0
FATIGUE: 0
NAUSEA: 0
DIARRHEA: 0
CHILLS: 0
WHEEZING: 0

## 2024-08-09 NOTE — PROGRESS NOTES
Subjective    Lis Zeng is a 74 y.o. female who presents for Urine Frequency (Going a lot of times through out the day.) and Cognitive.    HPI  She presents to the office with her  today to discuss her often going to the bathroom and cognition.   He reports she spends a lot of time in the bathroom. He is not sure if she is actually going to the bathroom.   She denies burning with urination, frequency or urgency.   No blood noted in urine or with wiping.   No cloudy urine.  No odor.  No vaginal burning, itching or discharge.  (+) low back pain- generally worse through the night.  She reports she is having bowel movements.   They are formed. She does report at times bowel movements can be painful.   (+) occasional pain in abdomen but not very often.  No recent abdominal pain.  No nausea or vomiting  No fever or chills.     No chest pain, SOB or palpitations.  (+) moist cough occurring a couple times a day. Noticed it over the past month     reports cognition and behavior has been an issue over the past couple of weeks.  Starts getting ready to go to bed at 9 AM and she starts fighting with him about something. I  He reports several incidences of her swearing at him, throwing things at  and yelling at night.   Was up last night for 3.5 hours.  She is waking up agitated several times throughout the night.   She is not wandering.   She is never a harm to herself.     Review of Systems   Constitutional:  Negative for chills, fatigue and fever.   Respiratory:  Positive for cough. Negative for shortness of breath and wheezing.    Cardiovascular:  Negative for chest pain, palpitations and leg swelling.   Gastrointestinal:  Positive for constipation. Negative for abdominal pain, diarrhea, nausea and vomiting.   Genitourinary:  Negative for difficulty urinating, dysuria, hematuria, pelvic pain and urgency.   Neurological:  Negative for headaches.     Objective   /82 (BP Location: Left arm,  Patient Position: Sitting)   Pulse 59   Temp 36.4 °C (97.6 °F) (Temporal)   Wt 61.5 kg (135 lb 9.6 oz)   SpO2 97%   BMI 23.28 kg/m²     Physical Exam  Constitutional:       General: She is not in acute distress.     Appearance: She is not toxic-appearing.   Neck:      Thyroid: No thyroid mass or thyromegaly.   Cardiovascular:      Rate and Rhythm: Normal rate and regular rhythm.      Pulses: Normal pulses.      Heart sounds: Normal heart sounds, S1 normal and S2 normal. No murmur heard.     Comments: Trace edema noted to bilateral feet.  Pulmonary:      Effort: Pulmonary effort is normal.      Breath sounds: Normal breath sounds and air entry.   Abdominal:      General: Abdomen is flat. Bowel sounds are normal.      Palpations: Abdomen is soft.      Tenderness: There is no abdominal tenderness. There is no right CVA tenderness or left CVA tenderness.   Lymphadenopathy:      Cervical: No cervical adenopathy.   Neurological:      Mental Status: She is alert.      Comments: She does not answer questions appropriately. Gets off topic often. Talks around questions.    Psychiatric:         Attention and Perception: She is attentive.         Mood and Affect: Mood is anxious. Affect is flat.         Speech: Speech normal.         Behavior: Behavior is agitated and slowed.         Thought Content: Thought content normal.         Cognition and Memory: Cognition is impaired. Memory is impaired. She exhibits impaired recent memory and impaired remote memory.         Judgment: Judgment is inappropriate.         Assessment/Plan   Problem List Items Addressed This Visit       Vitamin D deficiency    Relevant Medications    ergocalciferol (Vitamin D-2) 1.25 MG (28927 UT) capsule    Dementia (Multi) - Primary     Patient has been waking up at night and becoming agitated over the past 2 weeks. Labs are pending. She does not have a UTI based on UA. He is having a hard time getting her to leave the house for appointments/care. He  was provided [facility] calls information today as they live in [place]. Advised that she should not be left alone at home. She should go to the ER if he ever has a concern for his own safety or her safety.         Stage 3a chronic kidney disease (Multi)     Stable. Labs checked today.          Other Visit Diagnoses       Urine frequency        Relevant Orders    POCT UA Automated manually resulted (Completed)    Urine Culture    Proteinuria, unspecified type        Relevant Orders    Albumin-Creatinine Ratio, Urine Random            It has been a pleasure seeing you today!

## 2024-08-10 LAB
CREAT UR-MCNC: 129.9 MG/DL (ref 20–320)
MICROALBUMIN UR-MCNC: 42.8 MG/L
MICROALBUMIN/CREAT UR: 32.9 UG/MG CREAT
PTH-INTACT SERPL-MCNC: 137.2 PG/ML (ref 18.5–88)

## 2024-08-10 NOTE — ASSESSMENT & PLAN NOTE
Patient has been waking up at night and becoming agitated over the past 2 weeks. Labs are pending. She does not have a UTI based on UA. He is having a hard time getting her to leave the house for appointments/care. He was provided [facility] calls information today as they live in [place]. Advised that she should not be left alone at home. She should go to the ER if he ever has a concern for his own safety or her safety.

## 2024-08-11 LAB — BACTERIA UR CULT: NORMAL

## 2024-10-28 ENCOUNTER — APPOINTMENT (OUTPATIENT)
Dept: PRIMARY CARE | Facility: CLINIC | Age: 74
End: 2024-10-28
Payer: MEDICARE

## 2025-05-07 ENCOUNTER — APPOINTMENT (OUTPATIENT)
Dept: CARDIOLOGY | Facility: CLINIC | Age: 75
End: 2025-05-07
Payer: MEDICARE

## 2025-07-21 DIAGNOSIS — E03.9 ACQUIRED HYPOTHYROIDISM: ICD-10-CM

## 2025-07-21 RX ORDER — LEVOTHYROXINE SODIUM 125 UG/1
TABLET ORAL
Qty: 30 TABLET | Refills: 0 | Status: SHIPPED | OUTPATIENT
Start: 2025-07-21 | End: 2025-07-22

## 2025-07-22 DIAGNOSIS — E03.9 ACQUIRED HYPOTHYROIDISM: ICD-10-CM

## 2025-07-22 RX ORDER — LEVOTHYROXINE SODIUM 125 UG/1
TABLET ORAL
Qty: 30 TABLET | Refills: 0 | Status: SHIPPED | OUTPATIENT
Start: 2025-07-22

## 2025-07-22 RX ORDER — LEVOTHYROXINE SODIUM 125 UG/1
TABLET ORAL
Qty: 30 TABLET | Refills: 0 | OUTPATIENT
Start: 2025-07-22